# Patient Record
Sex: MALE | Race: WHITE | NOT HISPANIC OR LATINO
[De-identification: names, ages, dates, MRNs, and addresses within clinical notes are randomized per-mention and may not be internally consistent; named-entity substitution may affect disease eponyms.]

---

## 2017-05-20 ENCOUNTER — TRANSCRIPTION ENCOUNTER (OUTPATIENT)
Age: 62
End: 2017-05-20

## 2018-12-18 ENCOUNTER — EMERGENCY (EMERGENCY)
Facility: HOSPITAL | Age: 63
LOS: 1 days | Discharge: HOME | End: 2018-12-18
Attending: EMERGENCY MEDICINE | Admitting: EMERGENCY MEDICINE
Payer: MEDICARE

## 2018-12-18 ENCOUNTER — TRANSCRIPTION ENCOUNTER (OUTPATIENT)
Age: 63
End: 2018-12-18

## 2018-12-18 VITALS
WEIGHT: 235.01 LBS | OXYGEN SATURATION: 98 % | SYSTOLIC BLOOD PRESSURE: 156 MMHG | DIASTOLIC BLOOD PRESSURE: 94 MMHG | RESPIRATION RATE: 16 BRPM | HEIGHT: 73 IN | TEMPERATURE: 98 F | HEART RATE: 98 BPM

## 2018-12-18 DIAGNOSIS — S82.899A OTHER FRACTURE OF UNSPECIFIED LOWER LEG, INITIAL ENCOUNTER FOR CLOSED FRACTURE: Chronic | ICD-10-CM

## 2018-12-18 DIAGNOSIS — Z98.890 OTHER SPECIFIED POSTPROCEDURAL STATES: Chronic | ICD-10-CM

## 2018-12-18 LAB
ALBUMIN SERPL ELPH-MCNC: 3.5 G/DL — SIGNIFICANT CHANGE UP (ref 3.3–5)
ALP SERPL-CCNC: 131 U/L — HIGH (ref 30–120)
ALT FLD-CCNC: 23 U/L DA — SIGNIFICANT CHANGE UP (ref 10–60)
ANION GAP SERPL CALC-SCNC: 9 MMOL/L — SIGNIFICANT CHANGE UP (ref 5–17)
APPEARANCE UR: CLEAR — SIGNIFICANT CHANGE UP
APTT BLD: 31.7 SEC — SIGNIFICANT CHANGE UP (ref 28.5–37)
AST SERPL-CCNC: 30 U/L — SIGNIFICANT CHANGE UP (ref 10–40)
BASOPHILS # BLD AUTO: 0.07 K/UL — SIGNIFICANT CHANGE UP (ref 0–0.2)
BASOPHILS NFR BLD AUTO: 0.5 % — SIGNIFICANT CHANGE UP (ref 0–2)
BILIRUB SERPL-MCNC: 0.5 MG/DL — SIGNIFICANT CHANGE UP (ref 0.2–1.2)
BILIRUB UR-MCNC: NEGATIVE — SIGNIFICANT CHANGE UP
BUN SERPL-MCNC: 16 MG/DL — SIGNIFICANT CHANGE UP (ref 7–23)
CALCIUM SERPL-MCNC: 9.1 MG/DL — SIGNIFICANT CHANGE UP (ref 8.4–10.5)
CHLORIDE SERPL-SCNC: 106 MMOL/L — SIGNIFICANT CHANGE UP (ref 96–108)
CO2 SERPL-SCNC: 28 MMOL/L — SIGNIFICANT CHANGE UP (ref 22–31)
COLOR SPEC: YELLOW — SIGNIFICANT CHANGE UP
CREAT SERPL-MCNC: 1.13 MG/DL — SIGNIFICANT CHANGE UP (ref 0.5–1.3)
DIFF PNL FLD: NEGATIVE — SIGNIFICANT CHANGE UP
EOSINOPHIL # BLD AUTO: 0.36 K/UL — SIGNIFICANT CHANGE UP (ref 0–0.5)
EOSINOPHIL NFR BLD AUTO: 2.7 % — SIGNIFICANT CHANGE UP (ref 0–6)
GLUCOSE SERPL-MCNC: 100 MG/DL — HIGH (ref 70–99)
GLUCOSE UR QL: NEGATIVE MG/DL — SIGNIFICANT CHANGE UP
HCT VFR BLD CALC: 47.8 % — SIGNIFICANT CHANGE UP (ref 39–50)
HGB BLD-MCNC: 15.8 G/DL — SIGNIFICANT CHANGE UP (ref 13–17)
IMM GRANULOCYTES NFR BLD AUTO: 2.4 % — HIGH (ref 0–1.5)
INR BLD: 1.02 RATIO — SIGNIFICANT CHANGE UP (ref 0.88–1.16)
KETONES UR-MCNC: NEGATIVE — SIGNIFICANT CHANGE UP
LEUKOCYTE ESTERASE UR-ACNC: NEGATIVE — SIGNIFICANT CHANGE UP
LIDOCAIN IGE QN: 158 U/L — SIGNIFICANT CHANGE UP (ref 73–393)
LYMPHOCYTES # BLD AUTO: 1.94 K/UL — SIGNIFICANT CHANGE UP (ref 1–3.3)
LYMPHOCYTES # BLD AUTO: 14.4 % — SIGNIFICANT CHANGE UP (ref 13–44)
MCHC RBC-ENTMCNC: 28 PG — SIGNIFICANT CHANGE UP (ref 27–34)
MCHC RBC-ENTMCNC: 33.1 GM/DL — SIGNIFICANT CHANGE UP (ref 32–36)
MCV RBC AUTO: 84.8 FL — SIGNIFICANT CHANGE UP (ref 80–100)
MONOCYTES # BLD AUTO: 0.86 K/UL — SIGNIFICANT CHANGE UP (ref 0–0.9)
MONOCYTES NFR BLD AUTO: 6.4 % — SIGNIFICANT CHANGE UP (ref 2–14)
NEUTROPHILS # BLD AUTO: 9.9 K/UL — HIGH (ref 1.8–7.4)
NEUTROPHILS NFR BLD AUTO: 73.6 % — SIGNIFICANT CHANGE UP (ref 43–77)
NITRITE UR-MCNC: NEGATIVE — SIGNIFICANT CHANGE UP
PH UR: 7 — SIGNIFICANT CHANGE UP (ref 5–8)
PLATELET # BLD AUTO: 214 K/UL — SIGNIFICANT CHANGE UP (ref 150–400)
POTASSIUM SERPL-MCNC: 4.4 MMOL/L — SIGNIFICANT CHANGE UP (ref 3.5–5.3)
POTASSIUM SERPL-SCNC: 4.4 MMOL/L — SIGNIFICANT CHANGE UP (ref 3.5–5.3)
PROT SERPL-MCNC: 7.1 G/DL — SIGNIFICANT CHANGE UP (ref 6–8.3)
PROT UR-MCNC: 15 MG/DL
PROTHROM AB SERPL-ACNC: 11.1 SEC — SIGNIFICANT CHANGE UP (ref 10–12.9)
RBC # BLD: 5.64 M/UL — SIGNIFICANT CHANGE UP (ref 4.2–5.8)
RBC # FLD: 13.4 % — SIGNIFICANT CHANGE UP (ref 10.3–14.5)
SODIUM SERPL-SCNC: 143 MMOL/L — SIGNIFICANT CHANGE UP (ref 135–145)
SP GR SPEC: 1.01 — SIGNIFICANT CHANGE UP (ref 1.01–1.02)
UROBILINOGEN FLD QL: NEGATIVE MG/DL — SIGNIFICANT CHANGE UP
WBC # BLD: 13.45 K/UL — HIGH (ref 3.8–10.5)
WBC # FLD AUTO: 13.45 K/UL — HIGH (ref 3.8–10.5)

## 2018-12-18 PROCEDURE — 99284 EMERGENCY DEPT VISIT MOD MDM: CPT | Mod: 25

## 2018-12-18 PROCEDURE — 74177 CT ABD & PELVIS W/CONTRAST: CPT | Mod: 26

## 2018-12-18 PROCEDURE — 81001 URINALYSIS AUTO W/SCOPE: CPT

## 2018-12-18 PROCEDURE — 71046 X-RAY EXAM CHEST 2 VIEWS: CPT

## 2018-12-18 PROCEDURE — 85610 PROTHROMBIN TIME: CPT

## 2018-12-18 PROCEDURE — 71275 CT ANGIOGRAPHY CHEST: CPT

## 2018-12-18 PROCEDURE — 85027 COMPLETE CBC AUTOMATED: CPT

## 2018-12-18 PROCEDURE — 85730 THROMBOPLASTIN TIME PARTIAL: CPT

## 2018-12-18 PROCEDURE — 76705 ECHO EXAM OF ABDOMEN: CPT

## 2018-12-18 PROCEDURE — 71275 CT ANGIOGRAPHY CHEST: CPT | Mod: 26

## 2018-12-18 PROCEDURE — 71046 X-RAY EXAM CHEST 2 VIEWS: CPT | Mod: 26

## 2018-12-18 PROCEDURE — 74177 CT ABD & PELVIS W/CONTRAST: CPT

## 2018-12-18 PROCEDURE — 99284 EMERGENCY DEPT VISIT MOD MDM: CPT

## 2018-12-18 PROCEDURE — 36415 COLL VENOUS BLD VENIPUNCTURE: CPT

## 2018-12-18 PROCEDURE — 80053 COMPREHEN METABOLIC PANEL: CPT

## 2018-12-18 PROCEDURE — 76705 ECHO EXAM OF ABDOMEN: CPT | Mod: 26

## 2018-12-18 PROCEDURE — 83690 ASSAY OF LIPASE: CPT

## 2018-12-18 RX ORDER — IOHEXOL 300 MG/ML
30 INJECTION, SOLUTION INTRAVENOUS ONCE
Qty: 0 | Refills: 0 | Status: COMPLETED | OUTPATIENT
Start: 2018-12-18 | End: 2018-12-18

## 2018-12-18 RX ADMIN — IOHEXOL 30 MILLILITER(S): 300 INJECTION, SOLUTION INTRAVENOUS at 17:55

## 2018-12-18 NOTE — ED PROVIDER NOTE - MEDICAL DECISION MAKING DETAILS
62 y/o M with hx schizophrenia, smoker c/o RUQ pain x 8 days, currently on levaquin for pna, sent from urgent care today for gallbladder US, also with ecchymosis to RLQ/R flank, no fever/n/v/d; Will get labs, ua, ct chest and abdomen, re-assess

## 2018-12-18 NOTE — ED PROVIDER NOTE - OBJECTIVE STATEMENT
64 y/o M smoker with hx of schizophrenia presents with c/o RUQ pain x 8 days. Pt states that he has chronic cough, was seen by PMD, Dr. Barroso 8 days ago, had CXR which showed "small pneumonia", started on levaquin (day 8 today). States that he continued to have pain and was seen at [facility] ED few days later, had cxr and ct chest which showed thoracic aortic aneurysm, discharged home as costochondritis on nsaids for pain. Pt states that he went to urgent care today, sent to ED for gallbladder US due to RUQ pain, also with bruising to R lower abdomen. States that he felt a "pull" in his stomach few days ago while causing and after noted bruising to his abdomen. Denies trauma/fall, fever, n/v/d, CP, SOB, hematuria.

## 2018-12-18 NOTE — ED ADULT TRIAGE NOTE - CHIEF COMPLAINT QUOTE
PAtient sent from Urgent care for RUQ abdominal pain. Patient reports RUQ abdominal pain that radiates to Right flank. Patient reports he is on day 8/10 of levaquin for walking PNA as per PMD Dharmesh. Patient reports pain in RUQ worsens with palpation, coughing. Described as stabbing pain. Denies Nausea vomiting diarrhea.

## 2018-12-18 NOTE — ED ADULT NURSE NOTE - OBJECTIVE STATEMENT
Patient walked into ER from Urgent Care for pain under right breast going to back for over a week now.  Patient diagnosed with pneumonia 8 days ago and is on Levaquin.  Patient has ecchymosis area to lower right abdomen which he thinks came from coughing a lot.

## 2018-12-18 NOTE — ED ADULT NURSE NOTE - NSIMPLEMENTINTERV_GEN_ALL_ED
Implemented All Universal Safety Interventions:  [place] to call system. Call bell, personal items and telephone within reach. Instruct patient to call for assistance. Room bathroom lighting operational. Non-slip footwear when patient is off stretcher. Physically safe environment: no spills, clutter or unnecessary equipment. Stretcher in lowest position, wheels locked, appropriate side rails in place.

## 2018-12-18 NOTE — ED PROVIDER NOTE - PROGRESS NOTE DETAILS
Attending Contribution to Care: 64 y/o M pt with schizophrenia been treated for pneumonia with Levaquin by Dr. Barroso. Went to Urgent Care today for R flank pain, was told to go to ER to r/o gallbladder problem Denies fever, N/V, SOB, other sx. Continues to smoke cigarettes daily. PE: afebrile, NAD, lungs clear, abd soft nontender but has large area of ecchymosis of right flank. Plan: r/o gallbladder disease, r/o rib fracture or other intra-abdominal problem. Pt examined by ED attending, Dr. Escobedo who agreed with disposition and plan.

## 2018-12-18 NOTE — ED ADULT NURSE REASSESSMENT NOTE - NS ED NURSE REASSESS COMMENT FT1
Pt received in stretcher; appears comfortable; pt states he is comfortable however experiences pain in upper right quad of abdomen when coughing; ecchymotic area noted to right lower abdomen; pt awaiting CT scan; drank contrast; tolerated well.

## 2018-12-18 NOTE — ED PROVIDER NOTE - ABDOMINAL TENDER
+mild ttp RUQ, neg murphys sign, no rebound or guarding, no tenderness to RLQ, no CVAT B/right upper quadrant

## 2018-12-19 VITALS
OXYGEN SATURATION: 95 % | RESPIRATION RATE: 16 BRPM | TEMPERATURE: 98 F | HEART RATE: 94 BPM | DIASTOLIC BLOOD PRESSURE: 102 MMHG | SYSTOLIC BLOOD PRESSURE: 148 MMHG

## 2019-06-18 PROBLEM — F20.9 SCHIZOPHRENIA, UNSPECIFIED: Chronic | Status: ACTIVE | Noted: 2018-12-18

## 2019-07-16 ENCOUNTER — OUTPATIENT (OUTPATIENT)
Dept: OUTPATIENT SERVICES | Facility: HOSPITAL | Age: 64
LOS: 1 days | Discharge: HOME | End: 2019-07-16
Payer: MEDICARE

## 2019-07-16 VITALS
TEMPERATURE: 97 F | DIASTOLIC BLOOD PRESSURE: 97 MMHG | HEIGHT: 73 IN | OXYGEN SATURATION: 99 % | SYSTOLIC BLOOD PRESSURE: 145 MMHG | WEIGHT: 237.88 LBS | HEART RATE: 81 BPM | RESPIRATION RATE: 19 BRPM

## 2019-07-16 DIAGNOSIS — S82.899A OTHER FRACTURE OF UNSPECIFIED LOWER LEG, INITIAL ENCOUNTER FOR CLOSED FRACTURE: Chronic | ICD-10-CM

## 2019-07-16 DIAGNOSIS — Z01.818 ENCOUNTER FOR OTHER PREPROCEDURAL EXAMINATION: ICD-10-CM

## 2019-07-16 DIAGNOSIS — F20.9 SCHIZOPHRENIA, UNSPECIFIED: ICD-10-CM

## 2019-07-16 DIAGNOSIS — I10 ESSENTIAL (PRIMARY) HYPERTENSION: ICD-10-CM

## 2019-07-16 DIAGNOSIS — Z98.890 OTHER SPECIFIED POSTPROCEDURAL STATES: Chronic | ICD-10-CM

## 2019-07-16 DIAGNOSIS — M16.11 UNILATERAL PRIMARY OSTEOARTHRITIS, RIGHT HIP: ICD-10-CM

## 2019-07-16 LAB
ANION GAP SERPL CALC-SCNC: 5 MMOL/L — SIGNIFICANT CHANGE UP (ref 5–17)
ANISOCYTOSIS BLD QL: SLIGHT — SIGNIFICANT CHANGE UP
APTT BLD: 33.4 SEC — SIGNIFICANT CHANGE UP (ref 28.5–37)
BASOPHILS # BLD AUTO: 0 K/UL — SIGNIFICANT CHANGE UP (ref 0–0.2)
BASOPHILS NFR BLD AUTO: 0 % — SIGNIFICANT CHANGE UP (ref 0–2)
BLD GP AB SCN SERPL QL: SIGNIFICANT CHANGE UP
BUN SERPL-MCNC: 15 MG/DL — SIGNIFICANT CHANGE UP (ref 7–23)
CALCIUM SERPL-MCNC: 8.8 MG/DL — SIGNIFICANT CHANGE UP (ref 8.5–10.1)
CHLORIDE SERPL-SCNC: 110 MMOL/L — HIGH (ref 96–108)
CO2 SERPL-SCNC: 29 MMOL/L — SIGNIFICANT CHANGE UP (ref 22–31)
CREAT SERPL-MCNC: 1.22 MG/DL — SIGNIFICANT CHANGE UP (ref 0.5–1.3)
ELLIPTOCYTES BLD QL SMEAR: SLIGHT — SIGNIFICANT CHANGE UP
EOSINOPHIL # BLD AUTO: 0.23 K/UL — SIGNIFICANT CHANGE UP (ref 0–0.5)
EOSINOPHIL NFR BLD AUTO: 3 % — SIGNIFICANT CHANGE UP (ref 0–6)
GLUCOSE SERPL-MCNC: 112 MG/DL — HIGH (ref 70–99)
HBA1C BLD-MCNC: 5.9 % — HIGH (ref 4–5.6)
HCT VFR BLD CALC: 48.4 % — SIGNIFICANT CHANGE UP (ref 39–50)
HGB BLD-MCNC: 15.3 G/DL — SIGNIFICANT CHANGE UP (ref 13–17)
INR BLD: 0.95 RATIO — SIGNIFICANT CHANGE UP (ref 0.88–1.16)
LG PLATELETS BLD QL AUTO: SLIGHT — SIGNIFICANT CHANGE UP
LYMPHOCYTES # BLD AUTO: 1.21 K/UL — SIGNIFICANT CHANGE UP (ref 1–3.3)
LYMPHOCYTES # BLD AUTO: 16 % — SIGNIFICANT CHANGE UP (ref 13–44)
MANUAL SMEAR VERIFICATION: SIGNIFICANT CHANGE UP
MCHC RBC-ENTMCNC: 27.3 PG — SIGNIFICANT CHANGE UP (ref 27–34)
MCHC RBC-ENTMCNC: 31.6 GM/DL — LOW (ref 32–36)
MCV RBC AUTO: 86.4 FL — SIGNIFICANT CHANGE UP (ref 80–100)
MICROCYTES BLD QL: SIGNIFICANT CHANGE UP
MONOCYTES # BLD AUTO: 0.23 K/UL — SIGNIFICANT CHANGE UP (ref 0–0.9)
MONOCYTES NFR BLD AUTO: 3 % — SIGNIFICANT CHANGE UP (ref 2–14)
MRSA PCR RESULT.: SIGNIFICANT CHANGE UP
NEUTROPHILS # BLD AUTO: 5.91 K/UL — SIGNIFICANT CHANGE UP (ref 1.8–7.4)
NEUTROPHILS NFR BLD AUTO: 78 % — HIGH (ref 43–77)
NRBC # BLD: 0 /100 — SIGNIFICANT CHANGE UP (ref 0–0)
NRBC # BLD: SIGNIFICANT CHANGE UP /100 WBCS (ref 0–0)
PLAT MORPH BLD: NORMAL — SIGNIFICANT CHANGE UP
PLATELET # BLD AUTO: 181 K/UL — SIGNIFICANT CHANGE UP (ref 150–400)
POTASSIUM SERPL-MCNC: 4.4 MMOL/L — SIGNIFICANT CHANGE UP (ref 3.5–5.3)
POTASSIUM SERPL-SCNC: 4.4 MMOL/L — SIGNIFICANT CHANGE UP (ref 3.5–5.3)
PROTHROM AB SERPL-ACNC: 10.6 SEC — SIGNIFICANT CHANGE UP (ref 10–12.9)
RBC # BLD: 5.6 M/UL — SIGNIFICANT CHANGE UP (ref 4.2–5.8)
RBC # FLD: 15.6 % — HIGH (ref 10.3–14.5)
RBC BLD AUTO: ABNORMAL
S AUREUS DNA NOSE QL NAA+PROBE: SIGNIFICANT CHANGE UP
SODIUM SERPL-SCNC: 144 MMOL/L — SIGNIFICANT CHANGE UP (ref 135–145)
WBC # BLD: 7.58 K/UL — SIGNIFICANT CHANGE UP (ref 3.8–10.5)
WBC # FLD AUTO: 7.58 K/UL — SIGNIFICANT CHANGE UP (ref 3.8–10.5)

## 2019-07-16 PROCEDURE — 93010 ELECTROCARDIOGRAM REPORT: CPT

## 2019-07-16 RX ORDER — CIPROFLOXACIN LACTATE 400MG/40ML
1 VIAL (ML) INTRAVENOUS
Qty: 0 | Refills: 0 | DISCHARGE

## 2019-07-16 NOTE — H&P PST ADULT - ASSESSMENT
64M pmh htn, schizophrenia  c/o right hip pain 2/2 unilateral primary osteoarthritis here for PST for scheduled Right total hip arthroplasty  CAPRINI SCORE    AGE RELATED RISK FACTORS                                                       MOBILITY RELATED FACTORS  [ ] Age 41-60 years                                            (1 Point)                  [ ] Bed rest                                                        (1 Point)  [x ] Age: 61-74 years                                           (2 Points)                [ ] Plaster cast                                                   (2 Points)  [ ] Age= 75 years                                              (3 Points)                 [ ] Bed bound for more than 72 hours                   (2 Points)    DISEASE RELATED RISK FACTORS                                               GENDER SPECIFIC FACTORS  [ ] Edema in the lower extremities                       (1 Point)                  [ ] Pregnancy                                                     (1 Point)  [ ] Varicose veins                                               (1 Point)                  [ ] Post-partum < 6 weeks                                   (1 Point)             [x ] BMI > 25 Kg/m2                                            (1 Point)                  [ ] Hormonal therapy  or oral contraception            (1 Point)                 [ ] Sepsis (in the previous month)                        (1 Point)                  [ ] History of pregnancy complications  [ ] Pneumonia or serious lung disease                                               [ ] Unexplained or recurrent                       (1 Point)           (in the previous month)                               (1 Point)  [ ] Abnormal pulmonary function test                     (1 Point)                 SURGERY RELATED RISK FACTORS  [ ] Acute myocardial infarction                              (1 Point)                 [ ]  Section                                            (1 Point)  [ ] Congestive heart failure (in the previous month)  (1 Point)                 [ ] Minor surgery                                                 (1 Point)   [ ] Inflammatory bowel disease                             (1 Point)                 [ ] Arthroscopic surgery                                        (2 Points)  [ ] Central venous access                                    (2 Points)                [x ] General surgery lasting more than 45 minutes   (2 Points)       [ ] Stroke (in the previous month)                          (5 Points)               [ ] Elective arthroplasty                                        (5 Points)                                                                                                                                               HEMATOLOGY RELATED FACTORS                                                 TRAUMA RELATED RISK FACTORS  [ ] Prior episodes of VTE                                     (3 Points)                 [ ] Fracture of the hip, pelvis, or leg                       (5 Points)  [ ] Positive family history for VTE                         (3 Points)                 [ ] Acute spinal cord injury (in the previous month)  (5 Points)  [ ] Prothrombin 49300 A                                      (3 Points)                 [ ] Paralysis  (less than 1 month)                          (5 Points)  [ ] Factor V Leiden                                             (3 Points)                 [ ] Multiple Trauma within 1 month                         (5 Points)  [ ] Lupus anticoagulants                                     (3 Points)                                                           [ ] Anticardiolipin antibodies                                (3 Points)                                                       [ ] High homocysteine in the blood                      (3 Points)                                             [ ] Other congenital or acquired thrombophilia       (3 Points)                                                [ ] Heparin induced thrombocytopenia                  (3 Points)                                          Total Score [    8      ]

## 2019-07-16 NOTE — H&P PST ADULT - NSICDXPASTSURGICALHX_GEN_ALL_CORE_FT
PAST SURGICAL HISTORY:  Ankle fracture left with repair    H/O resection of rib     S/P tendon repair on right hand

## 2019-07-16 NOTE — H&P PST ADULT - HISTORY OF PRESENT ILLNESS
64M pmh htn, schizophrenia ---- c/o right hip pain 2/2 unilateral primary osteoarthritis here for PST for scheduled Right total hip arthroplasty 64M pmh htn, schizophrenia  c/o right hip pain 2/2 unilateral primary osteoarthritis here for PST for scheduled Right total hip arthroplasty

## 2019-07-16 NOTE — OCCUPATIONAL THERAPY INITIAL EVALUATION ADULT - SOCIAL CONCERNS
Complex psychosocial needs/coping issues/Pt voiced concerns about his recovery at home due to lack of support..

## 2019-07-16 NOTE — PHYSICAL THERAPY INITIAL EVALUATION ADULT - ADDITIONAL COMMENTS
Pt lives in a second floor apt of an rented house but pt will be able to stay on the main floor for one month post surgery. There no step at the entry and amrita rails,  close and able to be reached simultaneously, to go to the second floor. Pt has a tub/shower combo c fixed and retractable shower heads and regular toilet seat in BR. 3-1commode will fit well in the BR. Pt is R handed and drives. he landlord will be available to assist pt in post -op care upon discharge home. Pt has no DME.

## 2019-07-16 NOTE — OCCUPATIONAL THERAPY INITIAL EVALUATION ADULT - ADDITIONAL COMMENTS
Pt is functioning in his roles, self sufficient, driving & ambulating independently in the community without any assistive devices. Pt is right hand dominant and wears glasses for reading. Pt c/o 7/10 pain in his right hip ; this intensifies with changes in the weather, prolonged standing walking and it impacts ADL management;  pt takes no medication for pain relief. Pt scores 90% of patient specific scale.

## 2019-07-16 NOTE — OCCUPATIONAL THERAPY INITIAL EVALUATION ADULT - RANGE OF MOTION EXAMINATION, LOWER EXTREMITY
Left LE Active ROM was WNL  (within normal limits)/Right LE Active ROM was WFL   (within functional limits)/Right LE Passive ROM was WFL  (within functional limits)/Left LE Passive ROM was WNL (within normal limits)

## 2019-07-16 NOTE — PHYSICAL THERAPY INITIAL EVALUATION ADULT - CRITERIA FOR SKILLED THERAPEUTIC INTERVENTIONS
risk reduction/prevention/anticipated discharge recommendation/anticipated equipment needs at discharge/therapy frequency/functional limitations in following categories/impairments found/rehab potential

## 2019-07-16 NOTE — OCCUPATIONAL THERAPY INITIAL EVALUATION ADULT - LIVES WITH, PROFILE
alone/in a rented room in a private house with no steps to enter. Once inside, pt has to negotiate a flight of stairs with 20 step left hand rail to access the bedroom and bathroom. The bathroom has a walk in shower combination and standard toilet. Pt's toilet has adequate space to fit a commode over it. . Pt stated he will make arrangement with his landlord to stay in the living room on the main floor. alone/in a rented room in a private house with no steps to enter. Once inside, pt has to negotiate stairs with 20 steps left hand rail to access the bedroom and bathroom. The bathroom has a walk in shower combination and standard toilet. Pt's toilet has adequate space to fit a commode over it. . Pt stated he will make arrangement with his landlord to stay in the living room on the main floor.

## 2019-07-16 NOTE — H&P PST ADULT - NSICDXPROBLEM_GEN_ALL_CORE_FT
PROBLEM DIAGNOSES  Problem: Unilateral primary osteoarthritis, right hip  Assessment and Plan: right total hip replacement  Pre-op instructions given, patient verbalized understanding  Chlorhexidine wash instructions given   Pending Medical clearance    Problem: HTN (hypertension)  Assessment and Plan: Continue current regimen and medications.     Problem: Schizophrenia  Assessment and Plan: Continue current regimen and medications.

## 2019-07-16 NOTE — OCCUPATIONAL THERAPY INITIAL EVALUATION ADULT - PERTINENT HX OF CURRENT PROBLEM, REHAB EVAL
Pt is slated for elective surgery for right THR at a later date with MD Hernandez due to OA, chronic pain and DJD. Pt denied any pain falls in the past 3-6 months

## 2019-07-16 NOTE — H&P PST ADULT - NSANTHOSAYNRD_GEN_A_CORE
No. NIKIA screening performed.  STOP BANG Legend: 0-2 = LOW Risk; 3-4 = INTERMEDIATE Risk; 5-8 = HIGH Risk

## 2019-07-29 ENCOUNTER — TRANSCRIPTION ENCOUNTER (OUTPATIENT)
Age: 64
End: 2019-07-29

## 2019-07-30 ENCOUNTER — RESULT REVIEW (OUTPATIENT)
Age: 64
End: 2019-07-30

## 2019-07-30 ENCOUNTER — TRANSCRIPTION ENCOUNTER (OUTPATIENT)
Age: 64
End: 2019-07-30

## 2019-07-30 ENCOUNTER — INPATIENT (INPATIENT)
Facility: HOSPITAL | Age: 64
LOS: 0 days | Discharge: HOME HEALTH | End: 2019-07-31
Attending: ORTHOPAEDIC SURGERY | Admitting: ORTHOPAEDIC SURGERY
Payer: MEDICARE

## 2019-07-30 VITALS
SYSTOLIC BLOOD PRESSURE: 132 MMHG | TEMPERATURE: 99 F | HEART RATE: 99 BPM | HEIGHT: 73 IN | WEIGHT: 240.08 LBS | RESPIRATION RATE: 18 BRPM | DIASTOLIC BLOOD PRESSURE: 81 MMHG | OXYGEN SATURATION: 94 %

## 2019-07-30 DIAGNOSIS — Z98.890 OTHER SPECIFIED POSTPROCEDURAL STATES: Chronic | ICD-10-CM

## 2019-07-30 DIAGNOSIS — S82.899A OTHER FRACTURE OF UNSPECIFIED LOWER LEG, INITIAL ENCOUNTER FOR CLOSED FRACTURE: Chronic | ICD-10-CM

## 2019-07-30 LAB
BLD GP AB SCN SERPL QL: SIGNIFICANT CHANGE UP
HCT VFR BLD CALC: 42 % — SIGNIFICANT CHANGE UP (ref 39–50)
HGB BLD-MCNC: 13.4 G/DL — SIGNIFICANT CHANGE UP (ref 13–17)
MCHC RBC-ENTMCNC: 28.3 PG — SIGNIFICANT CHANGE UP (ref 27–34)
MCHC RBC-ENTMCNC: 31.9 GM/DL — LOW (ref 32–36)
MCV RBC AUTO: 88.6 FL — SIGNIFICANT CHANGE UP (ref 80–100)
NRBC # BLD: 0 /100 WBCS — SIGNIFICANT CHANGE UP (ref 0–0)
PLATELET # BLD AUTO: 163 K/UL — SIGNIFICANT CHANGE UP (ref 150–400)
RBC # BLD: 4.74 M/UL — SIGNIFICANT CHANGE UP (ref 4.2–5.8)
RBC # FLD: 15.6 % — HIGH (ref 10.3–14.5)
WBC # BLD: 13.59 K/UL — HIGH (ref 3.8–10.5)
WBC # FLD AUTO: 13.59 K/UL — HIGH (ref 3.8–10.5)

## 2019-07-30 PROCEDURE — 88311 DECALCIFY TISSUE: CPT | Mod: 26

## 2019-07-30 PROCEDURE — 88305 TISSUE EXAM BY PATHOLOGIST: CPT | Mod: 26

## 2019-07-30 RX ORDER — ONDANSETRON 8 MG/1
4 TABLET, FILM COATED ORAL EVERY 6 HOURS
Refills: 0 | Status: DISCONTINUED | OUTPATIENT
Start: 2019-07-30 | End: 2019-07-31

## 2019-07-30 RX ORDER — FENTANYL CITRATE 50 UG/ML
50 INJECTION INTRAVENOUS
Refills: 0 | Status: DISCONTINUED | OUTPATIENT
Start: 2019-07-30 | End: 2019-07-30

## 2019-07-30 RX ORDER — FOLIC ACID 0.8 MG
1 TABLET ORAL DAILY
Refills: 0 | Status: DISCONTINUED | OUTPATIENT
Start: 2019-07-30 | End: 2019-07-31

## 2019-07-30 RX ORDER — FERROUS SULFATE 325(65) MG
325 TABLET ORAL
Refills: 0 | Status: DISCONTINUED | OUTPATIENT
Start: 2019-07-30 | End: 2019-07-31

## 2019-07-30 RX ORDER — ACETAMINOPHEN 500 MG
650 TABLET ORAL ONCE
Refills: 0 | Status: COMPLETED | OUTPATIENT
Start: 2019-07-30 | End: 2019-07-30

## 2019-07-30 RX ORDER — MAGNESIUM HYDROXIDE 400 MG/1
30 TABLET, CHEWABLE ORAL DAILY
Refills: 0 | Status: DISCONTINUED | OUTPATIENT
Start: 2019-07-30 | End: 2019-07-31

## 2019-07-30 RX ORDER — LOSARTAN POTASSIUM 100 MG/1
1 TABLET, FILM COATED ORAL
Qty: 0 | Refills: 0 | DISCHARGE

## 2019-07-30 RX ORDER — POLYETHYLENE GLYCOL 3350 17 G/17G
17 POWDER, FOR SOLUTION ORAL DAILY
Refills: 0 | Status: DISCONTINUED | OUTPATIENT
Start: 2019-07-30 | End: 2019-07-31

## 2019-07-30 RX ORDER — HYDROMORPHONE HYDROCHLORIDE 2 MG/ML
0.5 INJECTION INTRAMUSCULAR; INTRAVENOUS; SUBCUTANEOUS EVERY 4 HOURS
Refills: 0 | Status: DISCONTINUED | OUTPATIENT
Start: 2019-07-30 | End: 2019-07-31

## 2019-07-30 RX ORDER — SODIUM CHLORIDE 9 MG/ML
3 INJECTION INTRAMUSCULAR; INTRAVENOUS; SUBCUTANEOUS EVERY 8 HOURS
Refills: 0 | Status: DISCONTINUED | OUTPATIENT
Start: 2019-07-30 | End: 2019-07-30

## 2019-07-30 RX ORDER — SODIUM CHLORIDE 9 MG/ML
1000 INJECTION INTRAMUSCULAR; INTRAVENOUS; SUBCUTANEOUS
Refills: 0 | Status: DISCONTINUED | OUTPATIENT
Start: 2019-07-30 | End: 2019-07-31

## 2019-07-30 RX ORDER — ACETAMINOPHEN 500 MG
975 TABLET ORAL EVERY 8 HOURS
Refills: 0 | Status: DISCONTINUED | OUTPATIENT
Start: 2019-07-30 | End: 2019-07-31

## 2019-07-30 RX ORDER — PANTOPRAZOLE SODIUM 20 MG/1
40 TABLET, DELAYED RELEASE ORAL
Refills: 0 | Status: DISCONTINUED | OUTPATIENT
Start: 2019-07-30 | End: 2019-07-31

## 2019-07-30 RX ORDER — OXYCODONE HYDROCHLORIDE 5 MG/1
10 TABLET ORAL EVERY 4 HOURS
Refills: 0 | Status: DISCONTINUED | OUTPATIENT
Start: 2019-07-30 | End: 2019-07-31

## 2019-07-30 RX ORDER — LOSARTAN POTASSIUM 100 MG/1
100 TABLET, FILM COATED ORAL DAILY
Refills: 0 | Status: DISCONTINUED | OUTPATIENT
Start: 2019-07-30 | End: 2019-07-31

## 2019-07-30 RX ORDER — ARIPIPRAZOLE 15 MG/1
1 TABLET ORAL
Qty: 0 | Refills: 0 | DISCHARGE

## 2019-07-30 RX ORDER — SENNA PLUS 8.6 MG/1
2 TABLET ORAL AT BEDTIME
Refills: 0 | Status: DISCONTINUED | OUTPATIENT
Start: 2019-07-30 | End: 2019-07-31

## 2019-07-30 RX ORDER — SODIUM CHLORIDE 9 MG/ML
1000 INJECTION, SOLUTION INTRAVENOUS
Refills: 0 | Status: DISCONTINUED | OUTPATIENT
Start: 2019-07-30 | End: 2019-07-30

## 2019-07-30 RX ORDER — DOCUSATE SODIUM 100 MG
100 CAPSULE ORAL THREE TIMES A DAY
Refills: 0 | Status: DISCONTINUED | OUTPATIENT
Start: 2019-07-30 | End: 2019-07-31

## 2019-07-30 RX ORDER — CELECOXIB 200 MG/1
200 CAPSULE ORAL ONCE
Refills: 0 | Status: COMPLETED | OUTPATIENT
Start: 2019-07-30 | End: 2019-07-30

## 2019-07-30 RX ORDER — OXYCODONE HYDROCHLORIDE 5 MG/1
5 TABLET ORAL EVERY 4 HOURS
Refills: 0 | Status: DISCONTINUED | OUTPATIENT
Start: 2019-07-30 | End: 2019-07-31

## 2019-07-30 RX ORDER — OXYCODONE HYDROCHLORIDE 5 MG/1
10 TABLET ORAL ONCE
Refills: 0 | Status: DISCONTINUED | OUTPATIENT
Start: 2019-07-30 | End: 2019-07-30

## 2019-07-30 RX ORDER — ASPIRIN/CALCIUM CARB/MAGNESIUM 324 MG
325 TABLET ORAL
Refills: 0 | Status: DISCONTINUED | OUTPATIENT
Start: 2019-07-31 | End: 2019-07-31

## 2019-07-30 RX ORDER — DEXAMETHASONE 0.5 MG/5ML
8 ELIXIR ORAL ONCE
Refills: 0 | Status: COMPLETED | OUTPATIENT
Start: 2019-07-31 | End: 2019-07-31

## 2019-07-30 RX ORDER — CELECOXIB 200 MG/1
200 CAPSULE ORAL DAILY
Refills: 0 | Status: DISCONTINUED | OUTPATIENT
Start: 2019-07-31 | End: 2019-07-31

## 2019-07-30 RX ORDER — VANCOMYCIN HCL 1 G
1500 VIAL (EA) INTRAVENOUS ONCE
Refills: 0 | Status: COMPLETED | OUTPATIENT
Start: 2019-07-30 | End: 2019-07-30

## 2019-07-30 RX ORDER — FENTANYL CITRATE 50 UG/ML
25 INJECTION INTRAVENOUS
Refills: 0 | Status: DISCONTINUED | OUTPATIENT
Start: 2019-07-30 | End: 2019-07-30

## 2019-07-30 RX ORDER — ARIPIPRAZOLE 15 MG/1
30 TABLET ORAL DAILY
Refills: 0 | Status: DISCONTINUED | OUTPATIENT
Start: 2019-07-30 | End: 2019-07-31

## 2019-07-30 RX ADMIN — Medication 975 MILLIGRAM(S): at 21:05

## 2019-07-30 RX ADMIN — Medication 100 MILLIGRAM(S): at 23:00

## 2019-07-30 RX ADMIN — Medication 325 MILLIGRAM(S): at 16:37

## 2019-07-30 RX ADMIN — CELECOXIB 200 MILLIGRAM(S): 200 CAPSULE ORAL at 07:29

## 2019-07-30 RX ADMIN — Medication 975 MILLIGRAM(S): at 13:20

## 2019-07-30 RX ADMIN — SODIUM CHLORIDE 100 MILLILITER(S): 9 INJECTION INTRAMUSCULAR; INTRAVENOUS; SUBCUTANEOUS at 23:00

## 2019-07-30 RX ADMIN — Medication 300 MILLIGRAM(S): at 21:04

## 2019-07-30 RX ADMIN — Medication 975 MILLIGRAM(S): at 22:00

## 2019-07-30 RX ADMIN — SODIUM CHLORIDE 100 MILLILITER(S): 9 INJECTION INTRAMUSCULAR; INTRAVENOUS; SUBCUTANEOUS at 14:00

## 2019-07-30 RX ADMIN — OXYCODONE HYDROCHLORIDE 10 MILLIGRAM(S): 5 TABLET ORAL at 07:29

## 2019-07-30 RX ADMIN — OXYCODONE HYDROCHLORIDE 10 MILLIGRAM(S): 5 TABLET ORAL at 14:20

## 2019-07-30 RX ADMIN — Medication 100 MILLIGRAM(S): at 13:21

## 2019-07-30 RX ADMIN — Medication 975 MILLIGRAM(S): at 14:20

## 2019-07-30 RX ADMIN — OXYCODONE HYDROCHLORIDE 10 MILLIGRAM(S): 5 TABLET ORAL at 23:32

## 2019-07-30 RX ADMIN — OXYCODONE HYDROCHLORIDE 10 MILLIGRAM(S): 5 TABLET ORAL at 13:21

## 2019-07-30 RX ADMIN — HYDROMORPHONE HYDROCHLORIDE 0.5 MILLIGRAM(S): 2 INJECTION INTRAMUSCULAR; INTRAVENOUS; SUBCUTANEOUS at 16:51

## 2019-07-30 RX ADMIN — HYDROMORPHONE HYDROCHLORIDE 0.5 MILLIGRAM(S): 2 INJECTION INTRAMUSCULAR; INTRAVENOUS; SUBCUTANEOUS at 16:36

## 2019-07-30 RX ADMIN — Medication 650 MILLIGRAM(S): at 07:29

## 2019-07-30 RX ADMIN — SODIUM CHLORIDE 100 MILLILITER(S): 9 INJECTION, SOLUTION INTRAVENOUS at 12:04

## 2019-07-30 NOTE — DISCHARGE NOTE PROVIDER - NSDCFUADDAPPT_GEN_ALL_CORE_FT
If you have a question about your medicine, if you feel "off" or are having a "bad day," or if you need to see or speak with a doctor or if you need emergency services, please call our Orthopedic Navigator Help Line at [phone]. Your  is Shanell Lynne NP. You can call 24/7 - there will be a medical provider available to help you.    Follow up with your surgeon in 2 weeks (call dr kaur office for appointment)      Call and schedule a follow up appointment with your primary care physician for repeat blood work (CBC and BMP) for post hospital discharge follow-up care.    Return to ER for shortness of breath/calf tenderness.

## 2019-07-30 NOTE — OCCUPATIONAL THERAPY INITIAL EVALUATION ADULT - ADDITIONAL COMMENTS
Pre op assessment confirmed- Pt lives in a second floor apt of an rented house but pt will be able to stay on the main floor for one month post surgery. There no step at the entry and amrita rails,  close and able to be reached simultaneously, to go to the second floor. Pt has a tub/shower combo c fixed and retractable shower heads and regular toilet seat in BR. 3-1commode will fit well in the BR. Pt is R handed and drives. he landlord will be available to assist pt in post -op care upon discharge home.

## 2019-07-30 NOTE — OCCUPATIONAL THERAPY INITIAL EVALUATION ADULT - IMPAIRED TRANSFERS: SIT/STAND, REHAB EVAL
decreased ROM/pain/decreased strength

## 2019-07-30 NOTE — PHYSICAL THERAPY INITIAL EVALUATION ADULT - ADDITIONAL COMMENTS
Confirmed post-op: Pt lives in a second floor apt of an rented house but pt will be able to stay on the main floor for one month post surgery. There no step at the entry and amrita rails,  close and able to be reached simultaneously, to go to the second floor. Pt has a tub/shower combo c fixed and retractable shower heads and regular toilet seat in BR. 3-1commode will fit well in the BR. Pt is R handed and drives. he landlord will be available to assist pt in post -op care upon discharge home. Pt received DME

## 2019-07-30 NOTE — DISCHARGE NOTE PROVIDER - NSDCFUADDINST_GEN_ALL_CORE_FT
Keep aquacell Dressing Clean, Dry and Intact. May shower on POD#5 with Dressing on. Dressing may be removed on POD#7. Please do not scrub, soak, peel or pick at the dressing. No creams, lotions, or oils over dressing. May shower on POD#5 and let water run over dressing, no baths. Pat dry once out of shower.     If dressing is saturated from border to border. Remove dressing and cover with clean dry dressing.     Hip replacement precautions: Abduction pillow. Elevate the leg (while keeping hip precautions) as often as possible to help control swelling.

## 2019-07-30 NOTE — PHYSICAL THERAPY INITIAL EVALUATION ADULT - PASSIVE RANGE OF MOTION EXAMINATION, REHAB EVAL
no R hip flexion past 90, no right internal rotation, and no right hip adduction past neutral secondary to hip precautions/no Passive ROM deficits were identified

## 2019-07-30 NOTE — ASU PREOP CHECKLIST - COMMENTS
pt states he is also allergic to "a hypertensive water pill but unsure of the name"  dr. benitez and dr. kaur aware

## 2019-07-30 NOTE — CONSULT NOTE ADULT - SUBJECTIVE AND OBJECTIVE BOX
CRISTAL BERGMAN is a 64y Male s/p RIGHT TOTAL HIP REPLACEMENT   by Dr. Hernandez on 7/29/19. complains of postop pain; patient tolerated surgery well.    PMH: w/ h/o HTN (hypertension)  Schizophrenia    ROS: no fevers, chills, headache, dizziness, lightheadedness, chest pain, palpitations, shortness of breath, cough, phlegm, wheezing, abdominal pain, nausea, vomiting, diarrhea, constipation or urinary symptoms     PSH: H/O resection of rib  S/P tendon repair  Ankle fracture    SH: does not smoke or drink at this time    ALLERGIES: Haldol (Unknown)  penicillin (Unknown)  strawberry (Rash)    MEDS: acetaminophen   Tablet .. 975 milliGRAM(s) Oral every 8 hours  aluminum hydroxide/magnesium hydroxide/simethicone Suspension 30 milliLiter(s) Oral four times a day PRN  ARIPiprazole 30 milliGRAM(s) Oral daily  docusate sodium 100 milliGRAM(s) Oral three times a day  ferrous    sulfate 325 milliGRAM(s) Oral three times a day with meals  folic acid 1 milliGRAM(s) Oral daily  HYDROmorphone  Injectable 0.5 milliGRAM(s) IV Push every 4 hours PRN  losartan 100 milliGRAM(s) Oral daily  magnesium hydroxide Suspension 30 milliLiter(s) Oral daily PRN  multivitamin 1 Tablet(s) Oral daily  ondansetron Injectable 4 milliGRAM(s) IV Push every 6 hours PRN  oxyCODONE    IR 5 milliGRAM(s) Oral every 4 hours PRN  oxyCODONE    IR 10 milliGRAM(s) Oral every 4 hours PRN  pantoprazole    Tablet 40 milliGRAM(s) Oral before breakfast  polyethylene glycol 3350 17 Gram(s) Oral daily  senna 2 Tablet(s) Oral at bedtime PRN  sodium chloride 0.9%. 1000 milliLiter(s) IV Continuous <Continuous>  vancomycin  IVPB 1500 milliGRAM(s) IV Intermittent once    PHYS:  T(C): 36.3 (07-30-19 @ 15:48), Max: 37.2 (07-30-19 @ 06:38)  HR: 109 (07-30-19 @ 16:30) (98 - 112)  BP: 118/81 (07-30-19 @ 16:30) (100/69 - 132/81)  RR: 17 (07-30-19 @ 16:30) (14 - 18)  SpO2: 96% (07-30-19 @ 16:30) (92% - 98%)  HEENT unremarkable  neck no JVD or bruit  lungs, clear bilaterally  heart, regular rhythm, normal S1, S2, no murmurs, rubs or gallops  abdomen, soft, non tender, no organomegaly, normal bowel sounds  no cyanosis, clubbing, edema or calf tenderness  neuro, grossly normal                         13.4   13.59 )-----------( 163      ( 30 Jul 2019 11:50 )             42.0     Assessment and Plan: status post right total hip replacement; Hypertension; postop leucocytosis; history of schizophrenia; pain control; deep vein thrombophlebitis prophylaxis; physical therapy; bowel regimen; nutrition support; follow up labs; will follow. CRISTAL BERGMAN is a 64y Male s/p RIGHT TOTAL HIP REPLACEMENT   by Dr. Hernandez on 7/30/19. complains of postop pain; patient tolerated surgery well.    PMH: w/ h/o HTN (hypertension)  Schizophrenia    ROS: no fevers, chills, headache, dizziness, lightheadedness, chest pain, palpitations, shortness of breath, cough, phlegm, wheezing, abdominal pain, nausea, vomiting, diarrhea, constipation or urinary symptoms     PSH: H/O resection of rib  S/P tendon repair  Ankle fracture    SH: does not smoke or drink at this time    ALLERGIES: Haldol (Unknown)  penicillin (Unknown)  strawberry (Rash)    MEDS: acetaminophen   Tablet .. 975 milliGRAM(s) Oral every 8 hours  aluminum hydroxide/magnesium hydroxide/simethicone Suspension 30 milliLiter(s) Oral four times a day PRN  ARIPiprazole 30 milliGRAM(s) Oral daily  docusate sodium 100 milliGRAM(s) Oral three times a day  ferrous    sulfate 325 milliGRAM(s) Oral three times a day with meals  folic acid 1 milliGRAM(s) Oral daily  HYDROmorphone  Injectable 0.5 milliGRAM(s) IV Push every 4 hours PRN  losartan 100 milliGRAM(s) Oral daily  magnesium hydroxide Suspension 30 milliLiter(s) Oral daily PRN  multivitamin 1 Tablet(s) Oral daily  ondansetron Injectable 4 milliGRAM(s) IV Push every 6 hours PRN  oxyCODONE    IR 5 milliGRAM(s) Oral every 4 hours PRN  oxyCODONE    IR 10 milliGRAM(s) Oral every 4 hours PRN  pantoprazole    Tablet 40 milliGRAM(s) Oral before breakfast  polyethylene glycol 3350 17 Gram(s) Oral daily  senna 2 Tablet(s) Oral at bedtime PRN  sodium chloride 0.9%. 1000 milliLiter(s) IV Continuous <Continuous>  vancomycin  IVPB 1500 milliGRAM(s) IV Intermittent once    PHYS:  T(C): 36.3 (07-30-19 @ 15:48), Max: 37.2 (07-30-19 @ 06:38)  HR: 109 (07-30-19 @ 16:30) (98 - 112)  BP: 118/81 (07-30-19 @ 16:30) (100/69 - 132/81)  RR: 17 (07-30-19 @ 16:30) (14 - 18)  SpO2: 96% (07-30-19 @ 16:30) (92% - 98%)  HEENT unremarkable  neck no JVD or bruit  lungs, clear bilaterally  heart, regular rhythm, normal S1, S2, no murmurs, rubs or gallops  abdomen, soft, non tender, no organomegaly, normal bowel sounds  no cyanosis, clubbing, edema or calf tenderness  neuro, grossly normal                         13.4   13.59 )-----------( 163      ( 30 Jul 2019 11:50 )             42.0     Assessment and Plan: status post right total hip replacement; Hypertension; postop leucocytosis; history of schizophrenia; pain control; deep vein thrombophlebitis prophylaxis; physical therapy; bowel regimen; nutrition support; follow up labs; will follow.

## 2019-07-30 NOTE — DISCHARGE NOTE PROVIDER - HOSPITAL COURSE
64yMale with history of Right Hip Osteoarthritis presenting for Right GOLDEN by David Alva on 7/30/19. Risk and benefits of surgery were explained to the patient.The patient understood and agreed to proceed with surgery. Patient underwent the procedure with no intraoperative complications. Pt was brought in stable condition to the PACU. Once stable in PACU, pt was brought to the floor. During hospital stay pt was followed by Medicine during this admission. Pt had an uneventful hospital course. Pt is stable for discharge to [rehab/home] on POD#[ ] 64yMale with history of Right Hip Osteoarthritis presenting for Right GOLDEN by David Alva on 7/30/19. Risk and benefits of surgery were explained to the patient.The patient understood and agreed to proceed with surgery. Patient underwent the procedure with no intraoperative complications. Pt was brought in stable condition to the PACU. Once stable in PACU, pt was brought to the floor. During hospital stay pt was followed by Medicine during this admission. Pt had an uneventful hospital course. Pt is stable for discharge to [rehab/home] on POD#1

## 2019-07-30 NOTE — PHYSICAL THERAPY INITIAL EVALUATION ADULT - GAIT TRAINING, PT EVAL
Pt will independently ambulate 200feet with rolling walker without loss of balance, by 2-3days. Pt will independently negotiate 6 steps with B rails step to pattern  to enter and exit home, by 2-3 days

## 2019-07-30 NOTE — DISCHARGE NOTE PROVIDER - NSDCACTIVITY_GEN_ALL_CORE
Do not drive or operate machinery/Showering allowed/No heavy lifting/straining/Walking - Indoors allowed/Do not make important decisions/Walking - Outdoors allowed/Stairs allowed

## 2019-07-30 NOTE — DISCHARGE NOTE PROVIDER - CARE PROVIDER_API CALL
Herbie Hernandez)  Orthopaedic Surgery  [address] [place] [address]  Phone: [phone]  Fax: [phone]  Follow Up Time:

## 2019-07-30 NOTE — PHYSICAL THERAPY INITIAL EVALUATION ADULT - BED MOBILITY TRAINING, PT EVAL
Pt will independently perform all aspects of bed mobility to help prevent pressure ulcers, by 2 days

## 2019-07-31 ENCOUNTER — TRANSCRIPTION ENCOUNTER (OUTPATIENT)
Age: 64
End: 2019-07-31

## 2019-07-31 VITALS
TEMPERATURE: 98 F | SYSTOLIC BLOOD PRESSURE: 140 MMHG | DIASTOLIC BLOOD PRESSURE: 82 MMHG | RESPIRATION RATE: 17 BRPM | OXYGEN SATURATION: 95 % | HEART RATE: 88 BPM

## 2019-07-31 LAB
ANION GAP SERPL CALC-SCNC: 5 MMOL/L — SIGNIFICANT CHANGE UP (ref 5–17)
BUN SERPL-MCNC: 18 MG/DL — SIGNIFICANT CHANGE UP (ref 7–23)
CALCIUM SERPL-MCNC: 8.2 MG/DL — LOW (ref 8.5–10.1)
CHLORIDE SERPL-SCNC: 109 MMOL/L — HIGH (ref 96–108)
CO2 SERPL-SCNC: 25 MMOL/L — SIGNIFICANT CHANGE UP (ref 22–31)
CREAT SERPL-MCNC: 0.89 MG/DL — SIGNIFICANT CHANGE UP (ref 0.5–1.3)
GLUCOSE SERPL-MCNC: 139 MG/DL — HIGH (ref 70–99)
HCT VFR BLD CALC: 34.3 % — LOW (ref 39–50)
HGB BLD-MCNC: 11.1 G/DL — LOW (ref 13–17)
MCHC RBC-ENTMCNC: 28 PG — SIGNIFICANT CHANGE UP (ref 27–34)
MCHC RBC-ENTMCNC: 32.4 GM/DL — SIGNIFICANT CHANGE UP (ref 32–36)
MCV RBC AUTO: 86.4 FL — SIGNIFICANT CHANGE UP (ref 80–100)
NRBC # BLD: 0 /100 WBCS — SIGNIFICANT CHANGE UP (ref 0–0)
PLATELET # BLD AUTO: 141 K/UL — LOW (ref 150–400)
POTASSIUM SERPL-MCNC: 4.2 MMOL/L — SIGNIFICANT CHANGE UP (ref 3.5–5.3)
POTASSIUM SERPL-SCNC: 4.2 MMOL/L — SIGNIFICANT CHANGE UP (ref 3.5–5.3)
RBC # BLD: 3.97 M/UL — LOW (ref 4.2–5.8)
RBC # FLD: 15.7 % — HIGH (ref 10.3–14.5)
SODIUM SERPL-SCNC: 139 MMOL/L — SIGNIFICANT CHANGE UP (ref 135–145)
WBC # BLD: 13.39 K/UL — HIGH (ref 3.8–10.5)
WBC # FLD AUTO: 13.39 K/UL — HIGH (ref 3.8–10.5)

## 2019-07-31 PROCEDURE — 99238 HOSP IP/OBS DSCHRG MGMT 30/<: CPT

## 2019-07-31 RX ORDER — DOCUSATE SODIUM 100 MG
1 CAPSULE ORAL
Qty: 0 | Refills: 0 | DISCHARGE
Start: 2019-07-31

## 2019-07-31 RX ORDER — MAGNESIUM HYDROXIDE 400 MG/1
30 TABLET, CHEWABLE ORAL
Qty: 0 | Refills: 0 | DISCHARGE
Start: 2019-07-31

## 2019-07-31 RX ORDER — KETOROLAC TROMETHAMINE 30 MG/ML
30 SYRINGE (ML) INJECTION ONCE
Refills: 0 | Status: DISCONTINUED | OUTPATIENT
Start: 2019-07-31 | End: 2019-07-31

## 2019-07-31 RX ORDER — POLYETHYLENE GLYCOL 3350 17 G/17G
17 POWDER, FOR SOLUTION ORAL
Qty: 0 | Refills: 0 | DISCHARGE
Start: 2019-07-31

## 2019-07-31 RX ORDER — CELECOXIB 200 MG/1
1 CAPSULE ORAL
Qty: 30 | Refills: 0
Start: 2019-07-31 | End: 2019-08-29

## 2019-07-31 RX ORDER — OXYCODONE HYDROCHLORIDE 5 MG/1
1 TABLET ORAL
Qty: 30 | Refills: 0
Start: 2019-07-31 | End: 2019-08-04

## 2019-07-31 RX ORDER — ACETAMINOPHEN 500 MG
3 TABLET ORAL
Qty: 0 | Refills: 0 | DISCHARGE
Start: 2019-07-31

## 2019-07-31 RX ORDER — PANTOPRAZOLE SODIUM 20 MG/1
1 TABLET, DELAYED RELEASE ORAL
Qty: 30 | Refills: 0
Start: 2019-07-31 | End: 2019-08-29

## 2019-07-31 RX ORDER — ASPIRIN/CALCIUM CARB/MAGNESIUM 324 MG
1 TABLET ORAL
Qty: 60 | Refills: 0
Start: 2019-07-31 | End: 2019-08-29

## 2019-07-31 RX ADMIN — OXYCODONE HYDROCHLORIDE 10 MILLIGRAM(S): 5 TABLET ORAL at 12:20

## 2019-07-31 RX ADMIN — OXYCODONE HYDROCHLORIDE 10 MILLIGRAM(S): 5 TABLET ORAL at 06:30

## 2019-07-31 RX ADMIN — Medication 101.6 MILLIGRAM(S): at 05:37

## 2019-07-31 RX ADMIN — Medication 30 MILLILITER(S): at 12:20

## 2019-07-31 RX ADMIN — Medication 975 MILLIGRAM(S): at 14:38

## 2019-07-31 RX ADMIN — PANTOPRAZOLE SODIUM 40 MILLIGRAM(S): 20 TABLET, DELAYED RELEASE ORAL at 05:37

## 2019-07-31 RX ADMIN — CELECOXIB 200 MILLIGRAM(S): 200 CAPSULE ORAL at 12:22

## 2019-07-31 RX ADMIN — Medication 975 MILLIGRAM(S): at 06:20

## 2019-07-31 RX ADMIN — Medication 100 MILLIGRAM(S): at 05:37

## 2019-07-31 RX ADMIN — Medication 325 MILLIGRAM(S): at 05:39

## 2019-07-31 RX ADMIN — Medication 1 TABLET(S): at 12:19

## 2019-07-31 RX ADMIN — LOSARTAN POTASSIUM 100 MILLIGRAM(S): 100 TABLET, FILM COATED ORAL at 05:39

## 2019-07-31 RX ADMIN — Medication 1 MILLIGRAM(S): at 12:20

## 2019-07-31 RX ADMIN — OXYCODONE HYDROCHLORIDE 10 MILLIGRAM(S): 5 TABLET ORAL at 00:30

## 2019-07-31 RX ADMIN — OXYCODONE HYDROCHLORIDE 10 MILLIGRAM(S): 5 TABLET ORAL at 05:37

## 2019-07-31 RX ADMIN — Medication 30 MILLIGRAM(S): at 09:10

## 2019-07-31 RX ADMIN — CELECOXIB 200 MILLIGRAM(S): 200 CAPSULE ORAL at 13:22

## 2019-07-31 RX ADMIN — Medication 30 MILLIGRAM(S): at 08:52

## 2019-07-31 RX ADMIN — Medication 325 MILLIGRAM(S): at 12:19

## 2019-07-31 RX ADMIN — OXYCODONE HYDROCHLORIDE 10 MILLIGRAM(S): 5 TABLET ORAL at 13:20

## 2019-07-31 RX ADMIN — Medication 100 MILLIGRAM(S): at 14:38

## 2019-07-31 RX ADMIN — POLYETHYLENE GLYCOL 3350 17 GRAM(S): 17 POWDER, FOR SOLUTION ORAL at 12:20

## 2019-07-31 RX ADMIN — Medication 325 MILLIGRAM(S): at 07:53

## 2019-07-31 RX ADMIN — Medication 975 MILLIGRAM(S): at 05:37

## 2019-07-31 NOTE — DISCHARGE NOTE NURSING/CASE MANAGEMENT/SOCIAL WORK - NSDCFUADDAPPT_GEN_ALL_CORE_FT
If you have a question about your medicine, if you feel "off" or are having a "bad day," or if you need to see or speak with a doctor or if you need emergency services, please call our Orthopedic Navigator Help Line at [phone]. Your  is Shanell Lynne NP. You can call 24/7 - there will be a medical provider available to help you.    Follow up with your surgeon in 2 weeks (call dr kaur office for appointment)      Call and schedule a follow up appointment with your primary care physician for repeat blood work (CBC and BMP) for post hospital discharge follow-up care.    Return to ER for shortness of breath/calf tenderness.

## 2019-07-31 NOTE — DISCHARGE NOTE NURSING/CASE MANAGEMENT/SOCIAL WORK - NSDCDPATPORTLINK_GEN_ALL_CORE
You can access the [facility] Patient Portal, offered by [facility], by registering with the following website: [email or web address]

## 2019-07-31 NOTE — PROGRESS NOTE ADULT - SUBJECTIVE AND OBJECTIVE BOX
CRISTAL BERGMAN is a 64y Male s/p RIGHT TOTAL HIP REPLACEMENT  by Dr. Hernandez on 7/30/19. complains of postop pain; patient tolerated surgery well.    ROS: no pulmonary, cardiovascular, gastrointestinal, urological or neurological symptoms.     PHYS:  T(C): 36 (07-31-19 @ 07:29), Max: 37.1 (07-30-19 @ 11:22)  HR: 95 (07-31-19 @ 07:29) (56 - 112)  BP: 102/75 (07-31-19 @ 07:29) (100/69 - 130/87)  RR: 17 (07-31-19 @ 07:29) (14 - 17)  SpO2: 93% (07-31-19 @ 07:29) (92% - 98%)  vss; lungs, clear; heart, reg rhythm, no murmurs, rubs or gallops;   abd, soft, non tender, normal bowel sounds, no calf tenderness or edema                         11.1   13.39 )-----------( 141      ( 31 Jul 2019 06:18 )             34.3   07-31    139  |  109<H>  |  18  ----------------------------<  139<H>  4.2   |  25  |  0.89    Ca    8.2<L>      31 Jul 2019 06:18    Assessment and Plan: status post right total hip replacement; Hypertension; postop leucocytosis; postop anemia; random elevated glucose; history of schizophrenia; mild post op hypotension; intravenous saline bolus; pain control; deep vein thrombophlebitis prophylaxis; physical therapy; bowel regimen; nutrition support; follow up labs; will follow.
Post-op Check   POD#0 S/P Right GOLDEN   64yMale Patient seen and examined, Pain controlled  Patient Denies SOB, CP, N/V/D       PE: Right Hip/LE: Dressing C/D/I, Sensation/motor intact, DP 2+, FROM ankle/toes   B/L LE: Skin intact. +ROM hip/knee/ankle/toes. Ankle Dorsi/plantarflexion: 5/5. Calf: soft, compressible and nontender. DP/PT 2+ NVI                          13.4   13.59 )-----------( 163      ( 30 Jul 2019 11:50 )             42.0                 A: As above   P: Pain Control       DVT Prophylaxis      Incentive spirometry      Total hip precautions Reviewed       PT WBAT RLE      Isometric exercises      Discharge Planning      All the above discussed and understood by pt       Ortho to F/U
Patient is seen and examined at bedside. Denies CP/SOB/Dizziness/N/V/D/HA. Pain is controlled at rest. States 8/10 while ambulating.      Vital Signs Last 24 Hrs  T(C): 36 (31 Jul 2019 07:29), Max: 37.1 (30 Jul 2019 11:22)  T(F): 96.8 (31 Jul 2019 07:29), Max: 98.7 (30 Jul 2019 11:22)  HR: 95 (31 Jul 2019 07:29) (56 - 112)  BP: 102/75 (31 Jul 2019 07:29) (100/69 - 130/87)  BP(mean): --  RR: 17 (31 Jul 2019 07:29) (14 - 17)  SpO2: 93% (31 Jul 2019 07:29) (92% - 98%)    GEN: NAD  ABD: soft, NT/ND; no rebound or guarding;  Neurologic: AAOx3; CNS grossly intact; no focal deficits  Right hip: Aquacell Dressing C/D/I.   B/L LE's: Motor intact + EHL/FHL/TA/GS in the BL LE. Sensation is grossly intact B/L. Extremities warm B/L. Compartments soft, compressible B/L, no calf tenderness B/L. DP 2+ B/L.    Labs:                          11.1   13.39 )-----------( 141      ( 31 Jul 2019 06:18 )             34.3       07-31    139  |  109<H>  |  18  ----------------------------<  139<H>  4.2   |  25  |  0.89    Ca    8.2<L>      31 Jul 2019 06:18        A/P: Patient is a 64y y/o Male s/p right total hip arthroplasty, POD # 1  -wound care, isometric exercises, GI motility, new medications, hip precautions,  hospital course and discharge planning reviewed with pt  -Pain control/analgesia counseled. Dosing intervals and multimodal pain management. Toradol 30mg X 1 dose.   -Inc spirometry reviewed and counseled  -Venodynes/foot pumps  -PT/OT/WBAT  -Anticoagulation- asa 325mg bid  -medical collaboration: dr chery, appreciated  -DC planning: home today.
Pt feeling good.   Did PT earlier. Pain (stiffness) improved with ambulation.  DC home today

## 2019-08-01 ENCOUNTER — OUTPATIENT (OUTPATIENT)
Dept: OUTPATIENT SERVICES | Facility: HOSPITAL | Age: 64
LOS: 1 days | End: 2019-08-01
Payer: MEDICARE

## 2019-08-01 DIAGNOSIS — S82.899A OTHER FRACTURE OF UNSPECIFIED LOWER LEG, INITIAL ENCOUNTER FOR CLOSED FRACTURE: Chronic | ICD-10-CM

## 2019-08-01 DIAGNOSIS — Z98.890 OTHER SPECIFIED POSTPROCEDURAL STATES: Chronic | ICD-10-CM

## 2019-08-01 LAB — SURGICAL PATHOLOGY STUDY: SIGNIFICANT CHANGE UP

## 2019-08-01 PROCEDURE — G9001: CPT

## 2019-08-08 DIAGNOSIS — M16.11 UNILATERAL PRIMARY OSTEOARTHRITIS, RIGHT HIP: ICD-10-CM

## 2019-08-08 DIAGNOSIS — D72.829 ELEVATED WHITE BLOOD CELL COUNT, UNSPECIFIED: ICD-10-CM

## 2019-08-08 DIAGNOSIS — D62 ACUTE POSTHEMORRHAGIC ANEMIA: ICD-10-CM

## 2019-08-08 DIAGNOSIS — F20.9 SCHIZOPHRENIA, UNSPECIFIED: ICD-10-CM

## 2019-08-08 DIAGNOSIS — R73.9 HYPERGLYCEMIA, UNSPECIFIED: ICD-10-CM

## 2019-08-08 DIAGNOSIS — I95.81 POSTPROCEDURAL HYPOTENSION: ICD-10-CM

## 2019-08-08 DIAGNOSIS — I10 ESSENTIAL (PRIMARY) HYPERTENSION: ICD-10-CM

## 2019-08-16 DIAGNOSIS — Z71.89 OTHER SPECIFIED COUNSELING: ICD-10-CM

## 2019-08-16 PROBLEM — I10 ESSENTIAL (PRIMARY) HYPERTENSION: Chronic | Status: ACTIVE | Noted: 2019-07-16

## 2020-06-25 ENCOUNTER — APPOINTMENT (OUTPATIENT)
Dept: DERMATOLOGY | Facility: CLINIC | Age: 65
End: 2020-06-25
Payer: MEDICARE

## 2020-06-25 VITALS — WEIGHT: 230 LBS | HEIGHT: 73 IN | BODY MASS INDEX: 30.48 KG/M2

## 2020-06-25 DIAGNOSIS — L73.9 FOLLICULAR DISORDER, UNSPECIFIED: ICD-10-CM

## 2020-06-25 PROCEDURE — 11104 PUNCH BX SKIN SINGLE LESION: CPT

## 2020-06-25 PROCEDURE — 99203 OFFICE O/P NEW LOW 30 MIN: CPT | Mod: 25

## 2020-06-25 RX ORDER — ARIPIPRAZOLE 2 MG/1
TABLET ORAL
Refills: 0 | Status: ACTIVE | COMMUNITY

## 2020-06-25 NOTE — HISTORY OF PRESENT ILLNESS
[FreeTextEntry1] : Rash. [de-identified] : Began on the LE's approx. 1-2 weeks ago.  Slow progression.  Denies discomfort, itching or irritation.  No self tx.\par Patient feels he had a similar eruption on the UE's approx. 6-12 months ago, resolved.\par He has been tx'ed with Ambilify for 20 years, and losartan for 1 year.

## 2020-06-25 NOTE — ASSESSMENT
[FreeTextEntry1] : Rash - R/o Drug, ? T-cell proliferative dz / PLEVA, cannot r/o folliculitis.\par Education.\par Will discuss with patient when path is available.

## 2020-06-25 NOTE — PHYSICAL EXAM
[Alert] : alert [Oriented x 3] : ~L oriented x 3 [Well Nourished] : well nourished [Eyelids] : Eyelids [Ears] : Ears [Lips] : Lips [Neck] : Neck [FreeTextEntry3] : Erythematous papules, mildly indurated, diffusely on the LE's (proximal > distal), back and left abdomen.  UE's spared.

## 2020-07-06 ENCOUNTER — APPOINTMENT (OUTPATIENT)
Dept: DERMATOLOGY | Facility: CLINIC | Age: 65
End: 2020-07-06
Payer: MEDICARE

## 2020-07-06 DIAGNOSIS — R21 RASH AND OTHER NONSPECIFIC SKIN ERUPTION: ICD-10-CM

## 2020-07-06 PROCEDURE — 99024 POSTOP FOLLOW-UP VISIT: CPT

## 2020-07-06 NOTE — HISTORY OF PRESENT ILLNESS
[FreeTextEntry1] : Suture removal. [de-identified] : Abdomen well healed, without evidence of infection.\par Sutures removed.\par Path still pending.\par Will call patient with results when available.  He is mostly concerned of a drug eruption - clinically, I think this is unlikely.

## 2020-07-07 DIAGNOSIS — L56.8 OTHER SPECIFIED ACUTE SKIN CHANGES DUE TO ULTRAVIOLET RADIATION: ICD-10-CM

## 2020-07-07 LAB — CORE LAB BIOPSY: NORMAL

## 2020-09-23 ENCOUNTER — APPOINTMENT (OUTPATIENT)
Dept: DERMATOLOGY | Facility: CLINIC | Age: 65
End: 2020-09-23
Payer: MEDICARE

## 2020-09-23 VITALS — BODY MASS INDEX: 30.48 KG/M2 | HEIGHT: 73 IN | WEIGHT: 230 LBS

## 2020-09-23 DIAGNOSIS — L90.5 SCAR CONDITIONS AND FIBROSIS OF SKIN: ICD-10-CM

## 2020-09-23 PROCEDURE — 99024 POSTOP FOLLOW-UP VISIT: CPT
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
## 2021-12-03 NOTE — OCCUPATIONAL THERAPY INITIAL EVALUATION ADULT - HEALTH SCREEN CRITERIA
RE: DR. SCHROEDER - DOS: 12/20/21 - CANCELLATION  Received: Today  Vandana Ledesma  Patient procedure cancelled      yes

## 2022-03-02 ENCOUNTER — APPOINTMENT (OUTPATIENT)
Dept: PULMONOLOGY | Facility: CLINIC | Age: 67
End: 2022-03-02
Payer: MEDICARE

## 2022-03-02 VITALS
DIASTOLIC BLOOD PRESSURE: 87 MMHG | TEMPERATURE: 97.8 F | RESPIRATION RATE: 16 BRPM | OXYGEN SATURATION: 93 % | BODY MASS INDEX: 34.21 KG/M2 | HEART RATE: 78 BPM | SYSTOLIC BLOOD PRESSURE: 130 MMHG | HEIGHT: 73 IN | WEIGHT: 258.1 LBS

## 2022-03-02 PROCEDURE — 99205 OFFICE O/P NEW HI 60 MIN: CPT
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
## 2022-03-02 NOTE — CONSULT LETTER
[Dear  ___] : Dear  [unfilled], [Consult Letter:] : I had the pleasure of evaluating your patient, [unfilled]. [Please see my note below.] : Please see my note below. [Consult Closing:] : Thank you very much for allowing me to participate in the care of this patient.  If you have any questions, please do not hesitate to contact me. [FreeTextEntry3] : Sincerely,\par \par Gayla Bedolla MD\par [facility] Physician [facility]\par Pulmonary Medicine\par tel: [phone]\par fax: [phone]\par

## 2022-03-02 NOTE — ASSESSMENT
[FreeTextEntry1] : Mr. CRISTAL BERGMAN is a 66 year old former smoker (>30 pack year hx, quit 10/2021) here for evaluation.\par \par #Dysphagia/globus sensation - advised to f/u with ENT and GI. Referral provided.\par Patient dose note some reflux - will also give a trial of Protonix\par \par #Former smoker - quit 10/2021\par -- LDCT 10/2021, repeat in one year\par \par #PRYOR - may be related to underlying airway disease vs obesity/deconditioning\par -- obtain PFTs\par -- trial of Albuterol\par \par All questions answered. Patient in agreement with plan. \par Follow up in 6w  or sooner if needed.\par

## 2022-03-09 ENCOUNTER — RESULT REVIEW (OUTPATIENT)
Age: 67
End: 2022-03-09

## 2022-03-09 ENCOUNTER — APPOINTMENT (OUTPATIENT)
Dept: OTOLARYNGOLOGY | Facility: CLINIC | Age: 67
End: 2022-03-09
Payer: MEDICARE

## 2022-03-09 VITALS
HEART RATE: 89 BPM | BODY MASS INDEX: 34.19 KG/M2 | SYSTOLIC BLOOD PRESSURE: 120 MMHG | HEIGHT: 73 IN | DIASTOLIC BLOOD PRESSURE: 83 MMHG | WEIGHT: 258 LBS

## 2022-03-09 DIAGNOSIS — Z82.2 FAMILY HISTORY OF DEAFNESS AND HEARING LOSS: ICD-10-CM

## 2022-03-09 PROCEDURE — 31575 DIAGNOSTIC LARYNGOSCOPY: CPT

## 2022-03-09 PROCEDURE — 99204 OFFICE O/P NEW MOD 45 MIN: CPT | Mod: 25

## 2022-03-09 RX ORDER — FLUTICASONE PROPIONATE 0.5 MG/G
0.05 CREAM TOPICAL TWICE DAILY
Qty: 1 | Refills: 3 | Status: COMPLETED | COMMUNITY
Start: 2020-07-07 | End: 2022-03-09

## 2022-03-09 NOTE — ADDENDUM
[FreeTextEntry1] : Documented by Bharat Schwartz acting as scribe for Dr. Sim on 03/09/2022.\par \par All Medical record entries made by the Scribe were at my, Dr. Sim, direction and personally dictated by me on 03/09/2022. I have reviewed the chart and agree that the record accurately reflects my personal performance of the history, physical exam, assessment and plan. I have also personally directed, reviewed, and agreed with the discharge instructions.

## 2022-03-09 NOTE — CONSULT LETTER
[Dear  ___] : Dear  [unfilled], [Consult Letter:] : I had the pleasure of evaluating your patient, [unfilled]. [Please see my note below.] : Please see my note below. [Consult Closing:] : Thank you very much for allowing me to participate in the care of this patient.  If you have any questions, please do not hesitate to contact me. [Sincerely,] : Sincerely, [FreeTextEntry3] : Ricardo Sim MD FACS

## 2022-03-09 NOTE — REVIEW OF SYSTEMS
[Throat Clearing] : throat clearing [Throat Dryness] : throat dryness [Throat Itching] : throat itching [Negative] : Heme/Lymph [FreeTextEntry1] : difficulty swallowing

## 2022-03-09 NOTE — HISTORY OF PRESENT ILLNESS
[de-identified] : The patient presents with h/o reflux. Pt presents today c/o dry mouth, globus sensation, dysphagia. Pt reports a feeling of air not getting to the lungs, but his pulmonologist has told him that air is getting to the lungs. Pt also has a raspy voice, and admits to snoring along with being tired throughout the day. Is not on CPAP. Former smoker, quit 6 months ago. Denies changes in hearing.

## 2022-03-09 NOTE — ASSESSMENT
[FreeTextEntry1] : Reviewed and reconciled medications, allergies, PMHx, PSHx, SocHx, FMHx. \par \par h/o reflux\par dry mouth\par dysphagia\par globus sensation\par snoring\par Extreme narrowing of airway at the level of tonsils and soft palate. \par Swelling of cords and false cords. \par Edema and swelling arytenoids, post cricoid, and intra arytenoid. \par \par Plan:\par Flexible Fiberoptic Laryngoscopy. Reflux diet sheet provided. Videostrobe. FEEST. MBS. Sleep study. FU after tests.

## 2022-03-09 NOTE — PHYSICAL EXAM
[Hearing Sage Test (Tuning Fork On Forehead)] : no lateralization of tone [Normal] : lingual tonsils are normal [Midline] : trachea located in midline position [FreeTextEntry1] : deviation of septum- midportion to left, along the floor to right [de-identified] : class 3  [de-identified] : large floppy uvula. dry mouth, type 3 oral cavity [FreeTextEntry2] : Sinuses nontender to percussion. Sensations intact.  [de-identified] : PERRL

## 2022-03-09 NOTE — PROCEDURE
[Dysphagia] : dysphagia not clearly evaluated by indirect laryngoscopy [Complicated Symptoms] : complicated symptoms requiring more thorough examination than provided by mirror [Globus] : globus [None] : none [Flexible Endoscope] : examined with the flexible endoscope [de-identified] : Procedure: Flexible Fiberoptic Laryngoscopy: Risks, benefits, and alternatives of flexible endoscopy were explained to the patient. The patient gave oral consent to proceed. The flexible scope was inserted into the right nasal cavity and advanced towards the nasopharynx. Visualized mucosa over the turbinates and septum were as describe above.  The nasopharynx was clear. Hypopharynx was also without lesion or edema. Larynx was mobile without lesions. Extreme narrowing of airway at the level of tonsils and soft palate. Swelling of cords and false cords. Edema and swelling arytenoids, post cricoid, and intra arytenoid. No pooling, or obvious lesions or growths.  Base of tongue was within normal limits.

## 2022-03-15 ENCOUNTER — APPOINTMENT (OUTPATIENT)
Dept: OTOLARYNGOLOGY | Facility: CLINIC | Age: 67
End: 2022-03-15

## 2022-03-18 ENCOUNTER — OUTPATIENT (OUTPATIENT)
Dept: OUTPATIENT SERVICES | Facility: HOSPITAL | Age: 67
LOS: 1 days | End: 2022-03-18
Payer: MEDICARE

## 2022-03-18 ENCOUNTER — NON-APPOINTMENT (OUTPATIENT)
Age: 67
End: 2022-03-18

## 2022-03-18 DIAGNOSIS — R13.12 DYSPHAGIA, OROPHARYNGEAL PHASE: ICD-10-CM

## 2022-03-18 DIAGNOSIS — S82.899A OTHER FRACTURE OF UNSPECIFIED LOWER LEG, INITIAL ENCOUNTER FOR CLOSED FRACTURE: Chronic | ICD-10-CM

## 2022-03-18 DIAGNOSIS — Z98.890 OTHER SPECIFIED POSTPROCEDURAL STATES: Chronic | ICD-10-CM

## 2022-03-18 DIAGNOSIS — K21.9 GASTRO-ESOPHAGEAL REFLUX DISEASE WITHOUT ESOPHAGITIS: ICD-10-CM

## 2022-03-18 PROCEDURE — 74230 X-RAY XM SWLNG FUNCJ C+: CPT | Mod: 26

## 2022-03-18 NOTE — ASSESSMENT
[FreeTextEntry1] : MODIFIED BARIUM SWALLOW STUDY\par \par Date of Report: 03/18/2022\par Date of Evaluation: 03/18/2022\par Patient Name: Quang Telles\par YOB: 1955\par Date of Onset: ongoing \par Primary Diagnosis: Pharyngeal Dysphagia \par Treatment Diagnosis: Pharyngeal Dysphagia\par Referring Physician: Dr. Ricardo Sim\par \par Impressions/Results: There was no laryngeal penetration/aspiration observed for pureed, solid, mildly thick liquids or thin liquids. There was an incidental finding of bridging osteophytes at level C3, C4, and C5, as per Radiologist’s report, which did not impinge on bolus passage.\par \par Recommendations:\par 1) The patient was advised to consume a regular solid with thin liquid diet, as tolerated.\par 2) The patient was advised to allow for swallow between intakes and to consume small bites and small, single cup sips at a decreased rate of consumption.\par 3) The patient was advised to position himself upright (no <90 degrees) during and after eating for 30 minutes and to maintain reflux precautions\par 4) Swallowing therapy is not warranted at this time\par 5) Follow up with referring MD as directed. \par \par History: This 66 year old male was seen this morning for a Modified Barium Swallow Study to: _x__rule out aspiration; __x_assess for diet texture change as appropriate; and/or _x__ explore positional strategies and/or compensatory techniques to eliminate penetration/aspiration. \par \par Reason for Referral: To determine patient's ability to safely tolerate an oral diet.\par \par The physician ordered this procedure because he wants the patient to meet their nutrition/hydration needs by mouth without compromising respiratory status. The patient arrived to today’s evaluation reporting ongoing dysphagia for the past several months marked by feelings of pharyngeal stasis and intermittent coughing with and without PO. Upon clinician questioning, the patient is denying any unintentional weight loss. He is further denying odynophagia, change in breathing pattern during or following meals, and denied any recent/recurrent pneumonia. It should be noted that the patient is scheduled for a videostroboscopy and FEES on 3/23/22.\par \par Current Nutritional Intake: Per patient’s report, he has been consuming a regular solid with thin liquid diet.\par \par Medical History: As per patient’s report and chart review, his medical and surgical history are significant for:\par \par Active Problems\par Cicatrix (709.2) (L90.5)\par PRYOR (dyspnea on exertion) (786.09) (R06.00)\par Folliculitis (704.8) (L73.9)\par Former smoker (V15.82) (Z87.891)\par Photodermatitis (692.72) (L56.8)\par Rash (782.1) (R21)\par \par Past Medical History\par History of hypertension (V12.59) (Z86.79)\par \par Surgical History\par History of Ankle Surgery\par History of Hip Surgery Right\par \par Current Respiratory Status: __x_ Normal ___ Tracheostomy Tube \par \par ASSESSMENT\par \par The patient was assessed standing in the lateral and AP planes in the [facility], with Radiologist present. \par The patient was _x_ alert ___lethargic _x__cooperative ___uncooperative.\par Secretion management was __x_adequate ___inadequate. \par \par Consistencies Administered: \par Solids: _x__ Regular ___Soft & bite-sized ___Minced & Moist _x__Pureed\par Liquids: _x__ Thin __x_ Mildly thick liquids (previously Nectar Thick) ___Moderately thick liquids (previously Honey Thick)\par __x_ single cup sips _x__ consecutive cup sips _x_ teaspoon \par \par SUMMARY & IMPRESSION\par The patient demonstrated the following:\par \par Preliminary Fluoroscopy reveals:\par 1) At baseline, the patient demonstrated velopharyngeal movement that was WFL.\par 2) At baseline, the patient demonstrated a timely pharyngeal swallow trigger and adequate hyolaryngeal elevation and excursion. \par \par Videofluoroscopic Evaluation Reveals:\par \par 1) The patient demonstrated functional oral management of pureed, regular solid, mildly thick liquids, and thin liquid textures marked by adequate oral acceptance with adequate bolus collection, transfer, and AP transit time. Trace lingual and palatal residue observed post primary swallow which cleared with a spontaneous re-swallow.\par 2) The patient demonstrated a functional pharyngeal phase of the swallow for pureed, regular solid, mildly thick liquids, and thin liquid textures marked by a timely pharyngeal swallow trigger with adequate base of tongue retraction, adequate epiglottic deflection, adequate hyolaryngeal elevation/excursion, and adequate pharyngeal contractility resulting in trace stasis along the base of tongue, in the vallecula, along the posterior pharyngeal wall, and in the pyriforms which cleared with a spontaneous re-swallow. No laryngeal penetration/aspiration visualized.\par 3) Of note: An esophageal screen was performed. The patient was given a barium tablet in the lateral and AP views. The barium tablet was observed to course through the oropharynx and esophagus without any evidence of hold up. This cannot be considered a formal assessment of the esophagus. \par 4) Of note: An incidental finding of bridging osteophytes visualized at level C4, C5, C6, as per Radiologist’s report, which did not impinge on bolus passage.\par \par Aspiration - Penetration Scale: 1- Pureed, regular solid, mildly thick liquids and thin liquids \par \par Aspiration - Penetration Scale ([place] et al Dysphagia 11:93-98 (April 1996), Aspiration-Penetration Scale)\par 1. Material does not enter the airway\par 2. Material enters the airway, remains above the vocal folds, and is\par ejected from the airway\par 3. Material enters the airway, remains above the vocal folds, and is not\par ejected\par 4. Material enters the airway, contacts the vocal folds, and is ejected\par from the airway\par 5. Material enters the airway, contacts the vocal folds, and is not ejected\par from the airway\par 6. Material enters the airway, passes below the vocal folds and is ejected\par into the larynx or out of the airway\par 7. Material enters the airway, passes below the vocal folds, and is not\par ejected from the trachea despite effort\par 8. Material enters the airway, passes below the vocal folds, and no effort\par is made to eject\par \par The above results and recommendations have been discussed with the patient, who verbalized full understanding.\par \par Should you have any additional concerns, please contact the Center at [phone].\par \par Shavon Stanton M.S. CCC-SLP\par Speech-Language Pathologist. \par

## 2022-03-21 PROCEDURE — 92611 MOTION FLUOROSCOPY/SWALLOW: CPT

## 2022-03-21 PROCEDURE — 74230 X-RAY XM SWLNG FUNCJ C+: CPT

## 2022-03-21 PROCEDURE — A9698: CPT

## 2022-03-23 ENCOUNTER — APPOINTMENT (OUTPATIENT)
Dept: OTOLARYNGOLOGY | Facility: CLINIC | Age: 67
End: 2022-03-23
Payer: MEDICARE

## 2022-03-23 PROCEDURE — 31579 LARYNGOSCOPY TELESCOPIC: CPT | Mod: GN

## 2022-03-23 PROCEDURE — 92612 ENDOSCOPY SWALLOW (FEES) VID: CPT | Mod: GN

## 2022-03-28 NOTE — ASSESSMENT
[FreeTextEntry1] : \par REPORT OF EVALUATION OF SWALLOW FUNCTION VIA FLEXIBLE ENDOSCOPIC EXAMINATION OF SWALLOWING (FEES) and ASSESSMENT OF VOCAL FUNCTION VIA VIDEOSTROBOSCOPY \par \par History: Information on this patient has been provided by the patient and via chart review. Quang Telles was seen for a Flexible Endoscopic Examination of swallowing to: _x__rule out aspiration; _x__assess for diet texture change as appropriate; and/or __x_ explore positional strategies and/or compensatory techniques to eliminate aspiration. Videostroboscopy assessment was also conducted for a formal assessment of the vocal mechanism.  \par \par Reason For Referral: To determine patient's ability to safely tolerate an oral diet and for formal assessment of the vocal mechanism. The physician ordered these procedures because he wants the patient to meet his nutrition/hydration needs by mouth without compromising respiratory status, and to formally assess the vocal mechanism during phonation. Mr. Telles arrived to today’s assessment reporting a very dry mouth that he attributes to both current medication use and history of smoking (he quit a little over 6 months ago). He also reports intermittent vocal hoarseness which improves after drinking water. In regard to swallow function, the patient stated that food/liquid will occasionally "go down the wrong pipe", with episodes occurring 1-2x weekly. He also describes difficulty "moving air in and out" when lying down, resulting in the need to prop his pillows and use a fan. He reports seeing a pulmonologist ~2 weeks ago, at which time he was told his lungs "looked okay". In addition, Mr. Telles reports "a lot of heartburn", which has recently improved after starting reflux medication. Upon further clinician questioning, the patient denied recent history of pneumonia and unintentional weight loss. \par \par Of note, the patient is known to this department from a recent Modified Barium Swallow study (3/18/22), at which time he was advised to continue a regular solid / thin liquid diet. Impressions stated: "There was no laryngeal penetration/aspiration observed for pureed, solid, mildly thick liquids or thin liquids. 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)\par Deviated septum (470) (J34.2)\par PRYOR (dyspnea on exertion) (786.09) (R06.00)\par Dysphagia, oropharyngeal (787.22) (R13.12)\par Folliculitis (704.8) (L73.9)\par Former smoker (V15.82) (Z87.891)\par Gastro-esophageal reflux disease without esophagitis (530.81) (K21.9)\par Loud snoring (786.09) (R06.83)\par Photodermatitis (692.72) (L56.8)\par Rash (782.1) (R21)\par Voice disturbance (784.40) (R49.9)\par \par Past Medical History\par History of hypertension (V12.59) (Z86.79)\par \par Surgical History\par History of Ankle Surgery\par History of Hip Surgery Right\par \par Family History\par Family history of hearing loss (V19.2) (Z82.2)\par Family history of Heart problem\par \par Social History\par Former smoker (V15.82) (Z87.891)\par \par Current Meds\par Abilify TABS\par Albuterol Sulfate  (90 Base) MCG/ACT Inhalation Aerosol Solution; INHALE 1 TO\par 2 PUFFS EVERY 4 TO 6 HOURS AS NEEDED\par Fluticasone Propionate 0.05 % External Cream; APPLY AND GENTLY MASSAGE INTO\par AFFECTED AREA(S) TWICE DAILY\par Losartan Potassium 50 MG Oral Tablet\par Metoprolol Succinate ER 50 MG Oral Tablet Extended Release 24 Hour\par Pantoprazole Sodium 40 MG Oral Tablet Delayed Release; TAKE 1 TABLET DAILY\par \par Allergies\par Penicillins\par \par Current Respiratory Status: _x_ Normal ___ Tracheostomy Tube  \par \par VIDEOSTROBOSCOPY:  \par The patient was explained the videostroboscopy and FEES procedures, and consent was obtained. A nasendoscope was passed via the right nasal cavity and positioned above the supraglottic structures. Thick  mucous is noted along the glottis and post-cricoid throughout testing, as well as active expectoration from the subglottis. The epiglottis, aryepiglottic folds, arytenoids, and true vocal folds were clearly visible with moderate to severe edema of the arytenoids, false vocal folds, interarytenoid, and post-cricoid. There was also edema of the false vocal folds and TVFs, bilaterally. An anatomical difference marked by a small, round protrusion of light pink color is intermittently noted at the junction of the posterior L TVF and interarytenoid. Assessment of TVF abduction and adduction revealed bilateral motion, however with periods of reduced closure and asymmetric phase symmetry during phonation. There was evidence of hyperfunction with recruitment of the arytenoids and false vocal folds bilaterally. \par \par ASSESSMENT \par Consistencies Administered:  \par Solids: _x__ Regular ___Soft __x_Puree  \par Liquids: _x_ Thin ___ Mildly Thick __Moderately Thick  \par _x_ single cup sips ___ by straw \par \par FEES PROCEDURE \par For the purposes of today’s assessment, the patient was provided with PO trials of puree, regular solid and thin liquid textures. In regard to swallow function, the patient demonstrated an inconsistent mild delay in pharyngeal triggering with the bolus head at the level of the valleculae for puree and solids. There was timely and complete tongue base propulsion/retraction, hyolaryngeal excursion and pharyngeal contractility without any evidence of laryngeal penetration or aspiration noted before or after any PO trial. There was trace diffuse stasis located in the hypopharynx (valleculae, pyriforms, posterior pharyngeal wall, lateral pharyngeal wall) post primary swallow for regular solid textures, however liquid wash was shown to facilitate clearance. There was also evidence of laryngopharyngeal reflux marked by the return of green-tinged material at the post-cricoid region with intermittent migration into the interarytenoid, however adequate airway protection is maintained throughout testing. At this point, the scope was removed and testing was discontinued, with the patient tolerating the procedure without difficulty. \par \par Aspiration - Penetration Scale: 1 - Puree; Regular solid; Thin liquid\par \par Aspiration - Penetration Scale (Rosenbek et al Dysphagia 11:93-98 (April 1996), Aspiration-Penetration Scale) \par 1. Material does not enter the airway \par 2. Material enters the airway, remains above the vocal folds, and is ejected from the airway \par 3. Material enters the airway, remains above the vocal folds, and is not ejected \par 4. Material enters the airway, contacts the vocal folds, and is ejected from the airway \par 5. Material enters the airway, contacts the vocal folds, and is not ejected from the airway \par 6. Material enters the airway, passes below the vocal folds and is ejected into the larynx or out of the airway \par 7. Material enters the airway, passes below the vocal folds, and is not ejected from the trachea despite effort \par 8. Material enters the airway, passes below the vocal folds, and no effort is made to eject \par \par IMPRESSIONS: 1) Dysphonia 2) Pharyngeal Swallow is deemed WFL \par \par PROGNOSIS: Good for recommended diet and functional communication\par \par RECOMMENDATIONS:\par 1) Continue a Regular Solid / Thin Liquid diet\par 2) Safe Feeding Guidelines: Maintain upright position (90 degree angle) during/after meals for 30 minutes; Slow rate of intake; Small bites; Small single cup sips; Allow for swallow between intakes\par 3) Maintain Reflux Precautions\par 4) A course of Voice Therapy (CPT - 83179) is recommended 1x per week, for 6-8 weeks, to maximize vocal function pending MD clearance/treatment plan. Swallow Therapy is not indicated at this time\par 5) Follow up with referring MD \par \par EDUCATION: The aforementioned findings and recommendations were discussed with the patient. Full understanding was demonstrated.\par \par Should you have additional questions/concerns, please contact this department at [phone].\par \par Marla Sommers M.S., CCC-SLP\par Speech-Language Pathologist

## 2022-03-28 NOTE — REASON FOR VISIT
[Initial] : initial visit for [FEES] : Fiberoptic Endoscopic Evaluation of Swallowing [Other: _____] : [unfilled]

## 2022-03-30 ENCOUNTER — APPOINTMENT (OUTPATIENT)
Dept: OTOLARYNGOLOGY | Facility: CLINIC | Age: 67
End: 2022-03-30
Payer: MEDICARE

## 2022-03-30 VITALS
SYSTOLIC BLOOD PRESSURE: 138 MMHG | DIASTOLIC BLOOD PRESSURE: 98 MMHG | WEIGHT: 258 LBS | BODY MASS INDEX: 34.19 KG/M2 | HEIGHT: 73 IN | HEART RATE: 64 BPM

## 2022-03-30 DIAGNOSIS — J98.8 OTHER SPECIFIED RESPIRATORY DISORDERS: ICD-10-CM

## 2022-03-30 DIAGNOSIS — R06.83 SNORING: ICD-10-CM

## 2022-03-30 PROCEDURE — 99214 OFFICE O/P EST MOD 30 MIN: CPT

## 2022-03-30 NOTE — ASSESSMENT
[FreeTextEntry1] : Reviewed and reconciled medications, allergies, PMHx, PSHx, SocHx, FMHx. \par \par h/o reflux, dysphagia, snoring, globus sensation\par \par MBS 3/18/22: no aspiration. bridging osteophytes at level C3, C4, and C5\par \par Videostrobe 3/23/22: Thick mucous is noted along the glottis and post-cricoid throughout testing, as well as active expectoration from the subglottis. The epiglottis, aryepiglottic folds, arytenoids, and true vocal folds with moderate to severe edema of the arytenoids, false vocal folds, interarytenoid, and post-cricoid. There was also edema of the false vocal folds and TVFs, bilaterally. An anatomical difference marked by a small, round protrusion of light pink color is intermittently noted at the junction of the posterior L TVF and interarytenoid. Assessment of TVF abduction and adduction revealed periods of reduced closure and asymmetric phase symmetry during phonation. There was evidence of hyperfunction with recruitment of false cords.\par \par FEEST 3/23/22: inconsistent mild delay in pharyngeal triggering with the bolus head at the level of the valleculae for puree and solids.  trace diffuse stasis located in the hypopharynx (valleculae, pyriforms, posterior pharyngeal wall, lateral pharyngeal wall) post primary swallow for regular solid textures, however liquid wash was shown to facilitate clearance. evidence of LPR.\par \par \par Plan:\par Reviewed MBS, Videostrobe and FEEST results. Sleep study. Continue Pantoprazole- QAM 30 minutes after other medications and 30 minutes before morning meal. Famotidine 20mg- 1 tablet, 30 minutes after other meds and 20 minutes before bed. Continue reflux diet as best as possible. FU after test. Over 50% of the visit spent in discussion.

## 2022-03-30 NOTE — REASON FOR VISIT
[Subsequent Evaluation] : a subsequent evaluation for [FreeTextEntry2] : Patient here to follow up on test results

## 2022-03-30 NOTE — HISTORY OF PRESENT ILLNESS
[de-identified] : The patient presents with h/o reflux, dysphagia, snoring, globus sensation. Pt presents today for his MBS, Videostrobe and FEEST results. Pt states that he has been following the reflux diet as best he can. Pt takes Pantoprazole before bed.

## 2022-03-30 NOTE — CONSULT LETTER
[Dear  ___] : Dear  [unfilled], [Courtesy Letter:] : I had the pleasure of seeing your patient, [unfilled], in my office today. [Please see my note below.] : Please see my note below. [Consult Closing:] : Thank you very much for allowing me to participate in the care of this patient.  If you have any questions, please do not hesitate to contact me. [Sincerely,] : Sincerely, [FreeTextEntry3] : Ricardo Sim MD FACS

## 2022-03-30 NOTE — ADDENDUM
[FreeTextEntry1] : Documented by Bharat Schwartz acting as scribe for Dr. Sim on 03/30/2022.\par \par All Medical record entries made by the Scribe were at my, Dr. Sim, direction and personally dictated by me on 03/30/2022. I have reviewed the chart and agree that the record accurately reflects my personal performance of the history, physical exam, assessment and plan. I have also personally directed, reviewed, and agreed with the discharge instructions.

## 2022-04-09 ENCOUNTER — LABORATORY RESULT (OUTPATIENT)
Age: 67
End: 2022-04-09

## 2022-04-11 ENCOUNTER — APPOINTMENT (OUTPATIENT)
Dept: PULMONOLOGY | Facility: CLINIC | Age: 67
End: 2022-04-11
Payer: MEDICARE

## 2022-04-11 PROCEDURE — 94060 EVALUATION OF WHEEZING: CPT

## 2022-04-11 PROCEDURE — 94727 GAS DIL/WSHOT DETER LNG VOL: CPT

## 2022-04-11 PROCEDURE — 94729 DIFFUSING CAPACITY: CPT

## 2022-04-18 ENCOUNTER — APPOINTMENT (OUTPATIENT)
Dept: OTOLARYNGOLOGY | Facility: CLINIC | Age: 67
End: 2022-04-18

## 2022-04-21 ENCOUNTER — APPOINTMENT (OUTPATIENT)
Dept: OTOLARYNGOLOGY | Facility: CLINIC | Age: 67
End: 2022-04-21
Payer: MEDICARE

## 2022-04-21 PROCEDURE — 92507 TX SP LANG VOICE COMM INDIV: CPT | Mod: GN

## 2022-04-27 ENCOUNTER — APPOINTMENT (OUTPATIENT)
Dept: OTOLARYNGOLOGY | Facility: CLINIC | Age: 67
End: 2022-04-27
Payer: MEDICARE

## 2022-04-27 ENCOUNTER — RX RENEWAL (OUTPATIENT)
Age: 67
End: 2022-04-27

## 2022-04-27 PROCEDURE — 92507 TX SP LANG VOICE COMM INDIV: CPT | Mod: GN

## 2022-05-04 ENCOUNTER — APPOINTMENT (OUTPATIENT)
Dept: OTOLARYNGOLOGY | Facility: CLINIC | Age: 67
End: 2022-05-04
Payer: MEDICARE

## 2022-05-04 PROCEDURE — 92507 TX SP LANG VOICE COMM INDIV: CPT | Mod: GN

## 2022-05-11 ENCOUNTER — APPOINTMENT (OUTPATIENT)
Dept: OTOLARYNGOLOGY | Facility: CLINIC | Age: 67
End: 2022-05-11
Payer: MEDICARE

## 2022-05-11 PROCEDURE — 92507 TX SP LANG VOICE COMM INDIV: CPT | Mod: GN

## 2022-05-17 ENCOUNTER — NON-APPOINTMENT (OUTPATIENT)
Age: 67
End: 2022-05-17

## 2022-05-18 ENCOUNTER — APPOINTMENT (OUTPATIENT)
Dept: OTOLARYNGOLOGY | Facility: CLINIC | Age: 67
End: 2022-05-18

## 2022-05-23 ENCOUNTER — APPOINTMENT (OUTPATIENT)
Dept: PULMONOLOGY | Facility: CLINIC | Age: 67
End: 2022-05-23
Payer: MEDICARE

## 2022-05-23 VITALS
HEART RATE: 69 BPM | BODY MASS INDEX: 34.06 KG/M2 | SYSTOLIC BLOOD PRESSURE: 111 MMHG | DIASTOLIC BLOOD PRESSURE: 78 MMHG | OXYGEN SATURATION: 93 % | TEMPERATURE: 97.7 F | HEIGHT: 73 IN | WEIGHT: 257 LBS

## 2022-05-23 DIAGNOSIS — Z23 ENCOUNTER FOR IMMUNIZATION: ICD-10-CM

## 2022-05-23 PROCEDURE — 90677 PCV20 VACCINE IM: CPT

## 2022-05-23 PROCEDURE — 90471 IMMUNIZATION ADMIN: CPT

## 2022-05-23 PROCEDURE — 99215 OFFICE O/P EST HI 40 MIN: CPT | Mod: 25

## 2022-05-23 RX ORDER — UMECLIDINIUM 62.5 UG/1
62.5 AEROSOL, POWDER ORAL DAILY
Qty: 1 | Refills: 3 | Status: DISCONTINUED | COMMUNITY
Start: 2022-04-13 | End: 2022-05-23

## 2022-05-23 NOTE — PHYSICAL EXAM
[No Acute Distress] : no acute distress [Normal Oropharynx] : normal oropharynx [Normal Appearance] : normal appearance [No Neck Mass] : no neck mass [Normal Rate/Rhythm] : normal rate/rhythm [Normal S1, S2] : normal s1, s2 [No Murmurs] : no murmurs [No Resp Distress] : no resp distress [Clear to Auscultation Bilaterally] : clear to auscultation bilaterally [No Abnormalities] : no abnormalities [Benign] : benign [Normal Gait] : normal gait [No Clubbing] : no clubbing [No Cyanosis] : no cyanosis [FROM] : FROM [Normal Color/ Pigmentation] : normal color/ pigmentation [No Focal Deficits] : no focal deficits [Oriented x3] : oriented x3 [Normal Affect] : normal affect [1+ Pitting] : 1+ pitting

## 2022-05-23 NOTE — HISTORY OF PRESENT ILLNESS
[Former] : former [Never] : never [TextBox_4] : Mr. CRISTAL BERGMAN is a 67 year old former smoker (>30 pack year hx, quit 10/2021) with Schizophrenia, COPD here for f/u.\par \par Has been following w ENT and speech. On PPI and Famotidine for reflux.\par \par Was started on Incruse. Says it helps. Using Albuterol about 2 times/day.\par \par PFTs 4/2022 - mod obstruction, normal volumes, normal DLCO.\par CT chest 10/2021 ([facility]) - RLL scarring/atelectasis, stable aortic aneurism, no concerning nodules\par \par PMH: rib trauma, hip replacement, Schitzophrenia, HTN\par Meds: Losartan, Abilify, Metoprolol\par All: Penicillin\par SH: former smoker, quit 2021, 33 pack year hx; works as  man\par FH: dad - Afib\par PMD: NIRAV CLEMENT\par Immunizations: covid vaccinated and boosted. did not have flu shot\par  [TextBox_11] : 1 [TextBox_13] : 33 [YearQuit] : 10/2021

## 2022-05-23 NOTE — CONSULT LETTER
[Dear  ___] : Dear  [unfilled], [Consult Letter:] : I had the pleasure of evaluating your patient, [unfilled]. [Please see my note below.] : Please see my note below. [Consult Closing:] : Thank you very much for allowing me to participate in the care of this patient.  If you have any questions, please do not hesitate to contact me. [FreeTextEntry3] : Sincerely,\par \par Gayla Bedolla MD\par [facility] Physician [facility]\par Pulmonary Medicine\par tel: [phone]\par fax: [phone]\par

## 2022-05-25 ENCOUNTER — APPOINTMENT (OUTPATIENT)
Dept: OTOLARYNGOLOGY | Facility: CLINIC | Age: 67
End: 2022-05-25
Payer: MEDICARE

## 2022-05-25 PROCEDURE — 92507 TX SP LANG VOICE COMM INDIV: CPT | Mod: GN

## 2022-06-02 NOTE — DISCHARGE NOTE NURSING/CASE MANAGEMENT/SOCIAL WORK - NSDCPNINST_GEN_ALL_CORE
Take your medications exactly as prescribed. Having your pain under control will help increase activity, improve deep breathing and coughing and prevent complications like pneumonia and blood clots in your legs. Some of the common side effects of pain medications are nausea, vomiting, itching, rash and upset stomach. Contact your doctor if you develop these or any other unusual systoms. Eat a diet rich in fiber and drink plenty of oral fluids. Use other pain management methods like, cold and warm applications, elevation of affected body part, listening to music, watching TV, yoga, etc.Do not take any other medications unless approved by your doctor. Do not drive, operate machinery, drink alcohol, or make any important decisions while taking narcotic pain medications. Store medication in its original bottle, in a locked cabinet away from the reach of children. Dispose of any unused and  medication safely.
Yes

## 2022-06-06 ENCOUNTER — APPOINTMENT (OUTPATIENT)
Dept: OTOLARYNGOLOGY | Facility: CLINIC | Age: 67
End: 2022-06-06
Payer: MEDICARE

## 2022-06-06 PROCEDURE — 92507 TX SP LANG VOICE COMM INDIV: CPT | Mod: GN

## 2022-06-07 ENCOUNTER — APPOINTMENT (OUTPATIENT)
Dept: SLEEP CENTER | Facility: CLINIC | Age: 67
End: 2022-06-07
Payer: MEDICARE

## 2022-06-07 ENCOUNTER — OUTPATIENT (OUTPATIENT)
Dept: OUTPATIENT SERVICES | Facility: HOSPITAL | Age: 67
LOS: 1 days | End: 2022-06-07
Payer: COMMERCIAL

## 2022-06-07 DIAGNOSIS — Z98.890 OTHER SPECIFIED POSTPROCEDURAL STATES: Chronic | ICD-10-CM

## 2022-06-07 DIAGNOSIS — S82.899A OTHER FRACTURE OF UNSPECIFIED LOWER LEG, INITIAL ENCOUNTER FOR CLOSED FRACTURE: Chronic | ICD-10-CM

## 2022-06-07 PROCEDURE — 95810 POLYSOM 6/> YRS 4/> PARAM: CPT

## 2022-06-07 PROCEDURE — 95810 POLYSOM 6/> YRS 4/> PARAM: CPT | Mod: 26

## 2022-06-15 ENCOUNTER — APPOINTMENT (OUTPATIENT)
Dept: OTOLARYNGOLOGY | Facility: CLINIC | Age: 67
End: 2022-06-15
Payer: MEDICARE

## 2022-06-15 VITALS
SYSTOLIC BLOOD PRESSURE: 115 MMHG | HEIGHT: 73 IN | DIASTOLIC BLOOD PRESSURE: 83 MMHG | BODY MASS INDEX: 33.8 KG/M2 | HEART RATE: 80 BPM | WEIGHT: 255 LBS

## 2022-06-15 PROCEDURE — 99214 OFFICE O/P EST MOD 30 MIN: CPT

## 2022-06-15 NOTE — ADDENDUM
[FreeTextEntry1] : Documented by Carmen Coates acting as scribe for Dr. Sim on 06/15/2022. \par \par All Medical record entries made by the scribe were at my. Dr. Sim direction and personally dictated by me on 06/15/2022. I have reviewed the chart and agree that the record accurately reflects my personal performance of the history, physical exam, assessment and plan. I have also personally directed, reviewed, and agreed with the discharge instructions.

## 2022-06-15 NOTE — HISTORY OF PRESENT ILLNESS
[de-identified] : The patient presents with h/o reflux, dysphagia, snoring, globus sensation. The patient presents today to discuss sleep study results. Pt reports speech therapy has been going okay. Pt states he couldn’t make it through the sleep study since he couldn’t tolerate it.

## 2022-06-15 NOTE — PHYSICAL EXAM
[Normal] : mucosa is normal [Midline] : trachea is located in midline position [Clear / Open well] : hypopharynx is clear and opens well [FreeTextEntry1] : uvula and soft palate are sitting at BOT\par tonsils are class 3 and lateral \par type 3 oral cavity

## 2022-06-15 NOTE — ASSESSMENT
[FreeTextEntry1] : Reviewed and reconciled medications, allergies, PMHx, PSHx, SocHx, FMHx. \par \par h/o reflux, dysphagia, snoring, globus sensation.\par The patient presents today to discuss sleep study results\par \par videostrobe 03/23/2022 - \par polypoid lesion portion left vc and in the arytenoid \par some hyperfunction \par \par sleep study 06/07/2022 - \par he slept at 9pm and gave up at 2 am \par never got to sleep at all \par stage 1 only \par during stage 1 he had 9 episodes of sleep apnea \par oxygen got down to 86%\par based on these results pt definitely has sleep apnea but cant tell the severity of it since pt didn’t sleep all night \par \par Plan:\par sleep study results discussed. prior videostrobe results discussed. AutoPAP machine ordered. Discussed r/b/a of removal of growth in the throat. FU after getting report from speech therapist.

## 2022-06-24 ENCOUNTER — APPOINTMENT (OUTPATIENT)
Dept: OTOLARYNGOLOGY | Facility: CLINIC | Age: 67
End: 2022-06-24
Payer: MEDICARE

## 2022-06-24 VITALS
HEIGHT: 73 IN | SYSTOLIC BLOOD PRESSURE: 122 MMHG | BODY MASS INDEX: 33.13 KG/M2 | WEIGHT: 250 LBS | HEART RATE: 73 BPM | DIASTOLIC BLOOD PRESSURE: 81 MMHG

## 2022-06-24 PROCEDURE — 99213 OFFICE O/P EST LOW 20 MIN: CPT | Mod: 25

## 2022-06-24 PROCEDURE — 31575 DIAGNOSTIC LARYNGOSCOPY: CPT

## 2022-06-24 RX ORDER — LOSARTAN POTASSIUM 50 MG/1
50 TABLET, FILM COATED ORAL
Refills: 0 | Status: DISCONTINUED | COMMUNITY
End: 2022-06-24

## 2022-06-24 NOTE — PHYSICAL EXAM
[Hearing Sage Test (Tuning Fork On Forehead)] : no lateralization of tone [] : septum deviated bilaterally [Normal] : mucosa is normal [Midline] : trachea located in midline position [Leukoplakia] : leukoplakia [de-identified] : TMJ snaps when he opens his mouth  [FreeTextEntry8] : cerumen removed via curettage - minimal on posterior canal wall  [de-identified] : large floppy uvula sitting on BOT

## 2022-06-24 NOTE — HISTORY OF PRESENT ILLNESS
[de-identified] : The patient presents with h/o reflux, dysphagia, snoring, globus sensation, sleep apnea. The patient presents today for a follow up on his throat. Pt reports feeling the same as last visit and is still having dysphagia sometimes. Pt denies getting his AutoPAP machine yet which was ordered last visit. Pt states taking all his reflux meds and following the diet. Pt reports he started smoking few weeks ago because of the stress.

## 2022-06-24 NOTE — ADDENDUM
[FreeTextEntry1] : Documented by Carmen Coates acting as scribe for Dr. Sim on 06/24/2022. \par \par All Medical record entries made by the scribe were at my. Dr. Sim direction and personally dictated by me on 06/24/2022. I have reviewed the chart and agree that the record accurately reflects my personal performance of the history, physical exam, assessment and plan. I have also personally directed, reviewed, and agreed with the discharge instructions.

## 2022-06-24 NOTE — PROCEDURE
[Unable to Cooperate with Mirror] : patient unable to cooperate with mirror [Dysphagia] : dysphagia not clearly evaluated by indirect laryngoscopy [None] : none [de-identified] : Procedure: Flexible Fiberoptic Laryngoscopy: Risks, benefits, and alternatives of flexible endoscopy were explained to the patient. The patient gave oral consent to proceed. The flexible scope was inserted into the right nasal cavity and advanced towards the nasopharynx. Visualized mucosa over the turbinates and septum were as described above. The nasopharynx was clear. Oropharyngeal walls were symmetric and mobile without lesion, mass, or edema. narrowing at the level of soft palate, very narrow airway. Hypopharynx was also without lesion or edema. Larynx was mobile without lesions. significant edema postcricoid interarytenoid and arytenoid. True vocal folds were white without mass or lesion. Base of tongue was within normal limits.

## 2022-06-24 NOTE — ASSESSMENT
[FreeTextEntry1] : Reviewed and reconciled medications, allergies, PMHx, PSHx, SocHx, FMHx. \par \par h/o reflux, dysphagia, snoring, globus sensation, sleep apnea\par \par Physical exam - \par right ear - cerumen removed - minimal on posterior canal wall \par deviated septum b/l \par large floppy uvula sitting on BOT \par TMJ snaps when he opens his mouth \par \par Flexible laryngoscopy -\par narrowing at the level of soft palate \par very narrow airway \par significant edema postcricoid interarytenoid and arytenoid \par unable to see all the swelling present since pt has been smoking - videostrobe required \par \par Plan:\par cerumen removed. Flexible laryngoscopy. another videostrobe ordered. need to stop smoking. continue reflux diet and meds. FU after videostrobe

## 2022-07-04 DIAGNOSIS — G47.33 OBSTRUCTIVE SLEEP APNEA (ADULT) (PEDIATRIC): ICD-10-CM

## 2022-07-12 ENCOUNTER — APPOINTMENT (OUTPATIENT)
Dept: OTOLARYNGOLOGY | Facility: CLINIC | Age: 67
End: 2022-07-12

## 2022-07-12 PROCEDURE — 92612 ENDOSCOPY SWALLOW (FEES) VID: CPT | Mod: GN

## 2022-07-15 ENCOUNTER — APPOINTMENT (OUTPATIENT)
Dept: OTOLARYNGOLOGY | Facility: CLINIC | Age: 67
End: 2022-07-15

## 2022-07-15 VITALS
DIASTOLIC BLOOD PRESSURE: 74 MMHG | HEIGHT: 73 IN | BODY MASS INDEX: 33.13 KG/M2 | WEIGHT: 250 LBS | HEART RATE: 69 BPM | SYSTOLIC BLOOD PRESSURE: 108 MMHG

## 2022-07-15 PROCEDURE — 99214 OFFICE O/P EST MOD 30 MIN: CPT

## 2022-07-15 RX ORDER — ARIPIPRAZOLE 30 MG/1
30 TABLET ORAL
Qty: 30 | Refills: 0 | Status: DISCONTINUED | COMMUNITY
Start: 2022-04-26 | End: 2022-07-15

## 2022-07-15 NOTE — HISTORY OF PRESENT ILLNESS
[de-identified] : The patient presents with h/o reflux, dysphagia, snoring, globus sensation, sleep apnea. The patient presents today to discuss videostrobe results. Pt reports to be still smoking. Pt states taking pantroprazole and famotidine for reflux and he has been following the reflux diet.

## 2022-07-15 NOTE — ADDENDUM
[FreeTextEntry1] : Documented by Carmen Coates acting as scribe for Dr. Sim on 07/15/2022. \par \par All Medical record entries made by the scribe were at my. Dr. Sim direction and personally dictated by me on 07/15/2022. I have reviewed the chart and agree that the record accurately reflects my personal performance of the history, physical exam, assessment and plan. I have also personally directed, reviewed, and agreed with the discharge instructions.

## 2022-07-15 NOTE — ASSESSMENT
[FreeTextEntry1] : Speech/ Language/ Voice Evaluation and REPORT OF EVALUATION OF VOCAL FUNCTION VIA VIDEOSTROBOSCOPY AND BEHAVIORAL/PERCEPTUAL ANALYSIS:\par \par History: Information on this patient has been provided by the patient and via chart review. Quang Telles is a 67 year-old male who was seen for a videostroboscopy and behavioral voice evaluation to formally assess vocal function. He was alert and cooperative for the entirety of today’s assessment.\par \par Reason For Referral: To formally assess vocal function. According to patient and medical chart review, Mr. Del Rio is a 67 year-old male with history of h/o reflux, COPD, dysphagia, dysphonia, and obstructive sleep apnea. He is well known to this service from previous FEES and videostroboscopy (3/23/22), and recent course of voice therapy (April to June 2022). Patient was discharged when judged to have met maximum potential with skilled ST services (please see chart for all SLP documentation). Patient arrives to today's evaluation reporting dry mouth, increased mucous and throat clearing. He reports leaving facility group home through sleep study. He has yet to receive his autoPAP. Patient also reports that he started smoking again a few weeks ago. He denies any changes in vocal quality since last seen for voice therapy. \par \par Medical History, as per charting: \par Active Problems\par Airway obstruction (519.8) (J98.8)\par Cicatrix (709.2) (L90.5)\par COPD, moderate (496) (J44.9)\par Deviated septum (470) (J34.2)\par PRYOR (dyspnea on exertion) (786.09) (R06.00)\par Dysphagia, oropharyngeal (787.22) (R13.12)\par Encounter for immunization (V03.89) (Z23)\par Folliculitis (704.8) (L73.9)\par Former smoker (V15.82) (Z87.891)\par Gastro-esophageal reflux disease without esophagitis (530.81) (K21.9)\par Hypoxia (799.02) (R09.02)\par Loud snoring (786.09) (R06.83)\par Obstructive sleep apnea (327.23) (G47.33)\par Photodermatitis (692.72) (L56.8)\par 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) (J38.1)\par Rash (782.1) (R21)\par Voice disturbance (784.40) (R49.9)\par \par Past Medical History\par History of hypertension (V12.59) (Z86.79)\par \par Current Meds\par Abilify TABS\par Albuterol Sulfate  (90 Base) MCG/ACT Inhalation Aerosol Solution; INHALE 1 TO\par 2 PUFFS EVERY 4 TO 6 HOURS AS NEEDED\par amLODIPine Besylate 5 MG Oral Tablet; TAKE 1 TABLET BY MOUTH EVERY DAY\par Anoro Ellipta 62.5-25 MCG/INH Inhalation Aerosol Powder Breath Activated; 1 puff\par inhaled, daily\par ARIPiprazole 30 MG Oral Tablet; TAKE 1 TABLET BY MOUTH EVERY EVENING\par Famotidine 20 MG Oral Tablet; TAKE 1 TABLET AT BEDTIME\par Metoprolol Succinate ER 50 MG Oral Tablet Extended Release 24 Hour\par Pantoprazole Sodium 40 MG Oral Tablet Delayed Release; TAKE 1 TABLET BY MOUTH\par EVERY DAY\par \par Allergies\par Penicillins\par \par Current Respiratory Status: _x_ Normal ___ Tracheostomy Tube\par \par VIDEOSTROBOSCOPY:\par A nasendoscope was passed via the right nasal cavity and positioned above the supraglottic structures. Posterior pharyngeal wall bulging is visualized. There is a thick, white-tavo / yellow-tavo film along the base of tongue, consistent with possible thrush. The epiglottis, aryepiglottic folds, arytenoids, and true vocal folds were clearly visible with thick, white mucous noted diffusely throughout the hypopharynx. Severe diffuse edema of supraglottic structures, particularly the arytenoids, interarytenoid, postcricoid and false vocal cords. In addition, there is an anatomical change marked by a small, round prominence at the junction of the posterior left false vocal cord and interarytenoid, consistent with previous videostroboscopy. Ability to view the TVFs during inspiration/expiration was significantly limited due to the severity of false vocal fold edema. Rough medial edges were noted at times. In addition, there was intermittent visualization of a patchy white substance along the true vocal folds (?mucous versus anatomical change). Unable to adequately assess TVF mobility during abduction and adduction due to the severity of hyperfunction with frequent episodes of ventricular phonation via lateral compression/vibration of the false vocal cords. At this point, testing was discontinued and the scope carefully removed, with the patient tolerating the procedure without difficulty.\par \par BEHAVIORAL VOICE EVALUATION:\par Perceptually, Mr. Telles presented with a moderate dysphonia marked by a hoarse and strained vocal quality accompanied by restricted pitch range, back tone focus and use of hard glottal attack. In addition, the patient demonstrated reduced coordination of respiratory/phonatory processes and increased musculoskeletal tension in the neck and shoulders. Maximum phonation time was significantly reduced as compared to his age matched peers. Resonance and vocal volume were deemed relatively WFL.\par \par RECOMMENDATIONS:\par 1)  Follow up with referring MD for treatment plan given above findings. Consider continuation of voice therapy (CPT - 99552) versus alternative treatment options. \par 2) Maintain good vocal hygiene (i.e., increase hydration, implement vocal rest, avoid vocal misuse/overuse, cessation of smoking, etc.), as reviewed at the time of today's evaluation. \par \par EDUCATION: The above recommendations were discussed with the patient. \par \par Should you have additional questions/concerns, please contact this department at [phone].\par \par Marla Sommers M.S., CCC-Sl \par \par

## 2022-07-15 NOTE — ASSESSMENT
[FreeTextEntry1] : Reviewed and reconciled medications, allergies, PMHx, PSHx, SocHx, FMHx. \par \par h/o reflux, dysphagia, snoring, globus sensation, sleep apnea.\par The patient presents today for videostrobe \par \par videostrobe 07/12/2022 - \par lot of swelling \par small polyp at the back of left vc\par couldn’t see the true vc because of the swelling \par some patchy white areas which they couldn’t tell if it was mucus or not \par voice straining \par recommendation continue speech therapy, drink lots of water, stop smoking \par \par Plan:\par videostrobe results discussed. Discussed r/b/a of direct laryngoscopy with biopsy of the white area. stop smoking. continue speech therapy. more then 50% of the time spent consulting. FU 1 month

## 2022-07-17 ENCOUNTER — NON-APPOINTMENT (OUTPATIENT)
Age: 67
End: 2022-07-17

## 2022-08-04 ENCOUNTER — APPOINTMENT (OUTPATIENT)
Dept: OTOLARYNGOLOGY | Facility: CLINIC | Age: 67
End: 2022-08-04

## 2022-08-04 PROCEDURE — 92507 TX SP LANG VOICE COMM INDIV: CPT | Mod: GN

## 2022-08-18 ENCOUNTER — APPOINTMENT (OUTPATIENT)
Dept: OTOLARYNGOLOGY | Facility: CLINIC | Age: 67
End: 2022-08-18

## 2022-08-18 PROCEDURE — 92507 TX SP LANG VOICE COMM INDIV: CPT | Mod: GN

## 2022-08-19 ENCOUNTER — APPOINTMENT (OUTPATIENT)
Dept: OTOLARYNGOLOGY | Facility: CLINIC | Age: 67
End: 2022-08-19

## 2022-08-19 VITALS
HEIGHT: 73 IN | BODY MASS INDEX: 33.13 KG/M2 | DIASTOLIC BLOOD PRESSURE: 85 MMHG | SYSTOLIC BLOOD PRESSURE: 133 MMHG | HEART RATE: 77 BPM | WEIGHT: 250 LBS

## 2022-08-19 PROCEDURE — 99214 OFFICE O/P EST MOD 30 MIN: CPT | Mod: 25

## 2022-08-19 PROCEDURE — 31575 DIAGNOSTIC LARYNGOSCOPY: CPT

## 2022-08-19 NOTE — PHYSICAL EXAM
[Normal] : nasopharynx is normal [de-identified] : good motion of the neck  [FreeTextEntry8] : cerumen removed via curettage - minimal [FreeTextEntry9] : cerumen removed via curettage - minimal [FreeTextEntry1] : deviated septum to the left and midportion along the floor to the right  [de-identified] : white coating and edema of uvula, crowded airway

## 2022-08-19 NOTE — ADDENDUM
[FreeTextEntry1] : Documented by aCrmen Coates acting as scribe for Dr. Sim on 08/19/2022. \par \par All Medical record entries made by the scribe were at my. Dr. Sim direction and personally dictated by me on 08/19/2022. I have reviewed the chart and agree that the record accurately reflects my personal performance of the history, physical exam, assessment and plan. I have also personally directed, reviewed, and agreed with the discharge instructions.  Helical Rim Text: The closure involved the helical rim.

## 2022-08-19 NOTE — HISTORY OF PRESENT ILLNESS
[de-identified] : The patient presents with h/o reflux, dysphagia, snoring, globus sensation. The patient presents today for 1 month follow up on vc white area. Pt reports to be still smoking. Pt states to be taking pantoprazole and famotidine and following the reflux diet.

## 2022-08-19 NOTE — ASSESSMENT
[FreeTextEntry1] : Reviewed and reconciled medications, allergies, PMHx, PSHx, SocHx, FMHx. \par \par h/o reflux, dysphagia, snoring, globus sensation. \par The patient presents today for 1 month follow up on vc white area\par \par speech therapy note - has reached the maximum improvement because of his smoking \par \par Physical exam - \par good motion of the neck \par deviated septum to the left and midportion along the floor to the right \par white coating and edema of uvula, crowded airway\par cerumen removed via curettage - minimal b/l \par \par Flexible laryngoscopy - \par nasopharynx normal\par narrowing level of soft plate but no obstruction\par left sided false cord lesion \par white changes on true cord \par generalized edema from smoking - reinkie's edema \par very difficult exam - still swollen and edematous \par \par Plan:\par cerumen removed b/l. Discussed r/b/a of removal and biopsy of white lesion on vc - possibility of it coming back since pt is still smoking. Flexible laryngoscopy. speech therapy note discussed. stop smoking. FU after procedure

## 2022-08-19 NOTE — PROCEDURE
[Unable to Cooperate with Mirror] : patient unable to cooperate with mirror [Gag Reflex] : gag reflex preventing mirror examination [None] : none [de-identified] : Procedure: Flexible Fiberoptic Laryngoscopy: Risks, benefits, and alternatives of flexible endoscopy were explained to the patient. The patient gave oral consent to proceed. The flexible scope was inserted into the right nasal cavity and advanced towards the nasopharynx. Visualized mucosa over the turbinates and septum were as described above. The nasopharynx was clear. narrowing level of soft plate but no obstruction. generalized edema from smoking - reinkie's edema. very difficult exam - still swollen and edematous. left sided false cord lesion, white changes on true cord  [de-identified] : white area on vc

## 2022-08-22 ENCOUNTER — APPOINTMENT (OUTPATIENT)
Dept: PULMONOLOGY | Facility: CLINIC | Age: 67
End: 2022-08-22

## 2022-08-22 VITALS
WEIGHT: 258 LBS | OXYGEN SATURATION: 91 % | DIASTOLIC BLOOD PRESSURE: 83 MMHG | TEMPERATURE: 97.8 F | HEART RATE: 67 BPM | BODY MASS INDEX: 34.19 KG/M2 | SYSTOLIC BLOOD PRESSURE: 120 MMHG | RESPIRATION RATE: 18 BRPM | HEIGHT: 73 IN

## 2022-08-22 PROCEDURE — 99215 OFFICE O/P EST HI 40 MIN: CPT | Mod: 25

## 2022-08-22 PROCEDURE — G0296 VISIT TO DETERM LDCT ELIG: CPT

## 2022-08-22 PROCEDURE — 99407 BEHAV CHNG SMOKING > 10 MIN: CPT

## 2022-08-22 PROCEDURE — 94618 PULMONARY STRESS TESTING: CPT

## 2022-08-22 NOTE — CONSULT LETTER
[Dear  ___] : Dear  [unfilled], [Consult Letter:] : I had the pleasure of evaluating your patient, [unfilled]. [Please see my note below.] : Please see my note below. [Consult Closing:] : Thank you very much for allowing me to participate in the care of this patient.  If you have any questions, please do not hesitate to contact me. [FreeTextEntry3] : Sincerely,\par \par Gayla Bedolla MD\par [facility] Physician [facility]\par Pulmonary Medicine\par tel: [phone]\par fax: [phone]\par

## 2022-08-22 NOTE — COUNSELING
[Cessation strategies including cessation program discussed] : Cessation strategies including cessation program discussed [Use of nicotine replacement therapies and other medications discussed] : Use of nicotine replacement therapies and other medications discussed [Encouraged to pick a quit date and identify support needed to quit] : Encouraged to pick a quit date and identify support needed to quit [No] : Not willing to quit smoking [ - Annual Lung Cancer Screening/Share Decision Making Discussion] : Annual Lung Cancer Screening/Share Decision Making Discussion. (I have advised this patient to have a Low Dose CT (LDCT) scan of the lungs and have discussed the following with the patient in a shared decision making discussion:   Benefits of Detection and Early Treatment: There is adequate evidence that annual screening for lung cancer with LDCT in a population of high-risk persons can prevent a substantial number of lung cancer–related deaths. The magnitude of benefit depends on the individual patient's risk for lung cancer, as those who are at highest risk are most likely to benefit. Screening cannot prevent most lung cancer–related deaths, and does not replace smoking cessation. Harms of Detection and Early Intervention and Treatment: The harms associated with LDCT screening include false-negative and false-positive results, incidental findings, over diagnosis, and radiation exposure. False-positive LDCT results occur in a substantial proportion of screened persons; 95% of all positive results do not lead to a diagnosis of cancer. In a high-quality screening program, further imaging can resolve most false-positive results; however, some patients may require invasive procedures. Radiation harms, including cancer resulting from cumulative exposure to radiation, vary depending on the age at the start of screening; the number of scans received; and the person's exposure to other sources of radiation, particularly other medical imaging.) [FreeTextEntry3] : 10

## 2022-08-22 NOTE — PHYSICAL EXAM
[No Acute Distress] : no acute distress [Normal Oropharynx] : normal oropharynx [Normal Appearance] : normal appearance [No Neck Mass] : no neck mass [Normal Rate/Rhythm] : normal rate/rhythm [Normal S1, S2] : normal s1, s2 [No Murmurs] : no murmurs [No Resp Distress] : no resp distress [No Abnormalities] : no abnormalities [Benign] : benign [Normal Gait] : normal gait [No Clubbing] : no clubbing [No Cyanosis] : no cyanosis [FROM] : FROM [1+ Pitting] : 1+ pitting [Normal Color/ Pigmentation] : normal color/ pigmentation [No Focal Deficits] : no focal deficits [Oriented x3] : oriented x3 [Normal Affect] : normal affect [TextBox_68] : decreased BS at the bases

## 2022-08-22 NOTE — HISTORY OF PRESENT ILLNESS
[Former] : former [Never] : never [TextBox_4] : Mr. CRISTAL BERGMAN is a 67 year old smoker (>30 pack year hx, quit 10/2021) with Schizophrenia, COPD here for f/u.\par \par Has been following w ENT and speech. On PPI and Famotidine for reflux. Planned for biopsy for VC lesion\par \par Started smoking again. \par Breathing is "fair". Walks a couple of miles. Walks slowly when walking up the stairs. \par Compliant with Anoro. Ran out of Albuterol. \par \par Did not get aPAP machine - patient has to pay for part of it. Not ready at this time. \par No UC/ER visits. 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 did not have flu shot\par  [TextBox_11] : 1 [TextBox_13] : 33 [YearQuit] : 10/2021

## 2022-08-22 NOTE — ASSESSMENT
[FreeTextEntry1] : Mr. CRISTAL BERGMAN is a 66 year old former smoker (>30 pack year hx, quit 10/2021) w Schizophrenia, COPD, NIKIA  here for f/u. \par \par #COPD - mod, Gold Gr A\par -- c/w Anoro. Albuterol prn\par - Exercise oximetry on RA today: at rest  - P72: O2 90%; with exertion - P73: O2: 94 %\par O2 sats improved from 90% to 95% with deep breathing indicating component of hypoventilation.\par \par \par #Smoker- unfortunately started smoking again. Encouraged quitting. Support provided\par -- LDCT 10/2021, repeat ordered\par \par #Snoring - patient with obstructive and central apneas on PSG but short total sleep time. AHI of 112 may be artificially increased.\par -- need to repeat sleep study\par \par #Dilated ascending aortia - noted on CT, stable\par -- still needs cardiology appointment, patient aware\par \par #HCM - Prevnar 20 today; COVID vaccinated, boosted\par \par All questions answered. Patient in agreement with plan. \par Follow up in 4-5mo  or sooner if needed.\par

## 2022-09-06 NOTE — ED PROVIDER NOTE - TIMING
Show Follow-Up Variable: Yes Detail Level: Detailed Detail Level: Simple Detail Level: Zone sudden onset

## 2022-09-07 ENCOUNTER — RX RENEWAL (OUTPATIENT)
Age: 67
End: 2022-09-07

## 2022-09-13 ENCOUNTER — RX RENEWAL (OUTPATIENT)
Age: 67
End: 2022-09-13

## 2022-09-20 ENCOUNTER — RX RENEWAL (OUTPATIENT)
Age: 67
End: 2022-09-20

## 2022-09-20 ENCOUNTER — APPOINTMENT (OUTPATIENT)
Dept: OTOLARYNGOLOGY | Facility: AMBULATORY MEDICAL SERVICES | Age: 67
End: 2022-09-20

## 2022-09-20 ENCOUNTER — RESULT REVIEW (OUTPATIENT)
Age: 67
End: 2022-09-20

## 2022-09-20 PROCEDURE — 31571 LARYNGOSCOP W/VC INJ + SCOPE: CPT

## 2022-09-20 PROCEDURE — 31541 LARYNSCOP W/TUMR EXC + SCOPE: CPT

## 2022-10-03 ENCOUNTER — APPOINTMENT (OUTPATIENT)
Dept: OTOLARYNGOLOGY | Facility: CLINIC | Age: 67
End: 2022-10-03

## 2022-10-03 VITALS
HEIGHT: 72 IN | WEIGHT: 255 LBS | DIASTOLIC BLOOD PRESSURE: 78 MMHG | SYSTOLIC BLOOD PRESSURE: 117 MMHG | HEART RATE: 72 BPM | BODY MASS INDEX: 34.54 KG/M2

## 2022-10-03 PROCEDURE — 31575 DIAGNOSTIC LARYNGOSCOPY: CPT

## 2022-10-03 PROCEDURE — 99213 OFFICE O/P EST LOW 20 MIN: CPT | Mod: 25

## 2022-10-03 NOTE — ASSESSMENT
[FreeTextEntry1] : Reviewed and reconciled medications, allergies, PMHx, PSHx, SocHx, FMHx \par \par Pt. with h/o reflux, dysphagia, snoring, globus sensation presents today for post op visit from excision biopsy of vocal cord lesion 09/20/22. Patient states his voice feels pretty good right now. Patient states he started smoking again about a couple days ago. patient states he has a CPAP machine at home but he is having trouble with his machine and not really using it. \par \par \par Pathology 09/20/22:\par -benign papilloma\par \par Flexible Laryngoscopy:\par -narrowing at level of the soft palate\par -BOT looks normal\par -edema of the cords\par -area of biopsy looks well healed.\par -very narrow airway\par \par Physical Exam:\par - deviated septum b/l more obstructive on left than right.\par -large floppy uvula\par \par Plan: Stop smoking. Get in touch with people who gave you the CPAP to get the machine working. Start speech therapy. FU 3 months.

## 2022-10-03 NOTE — PHYSICAL EXAM
[] : septum deviated bilaterally [Midline] : trachea located in midline position [Normal] : no rashes [de-identified] : more obstructive on left than right  [de-identified] : large floppy uvula

## 2022-10-03 NOTE — CONSULT LETTER
[Dear  ___] : Dear  [unfilled], [Courtesy Letter:] : I had the pleasure of seeing your patient, [unfilled], in my office today. [FreeTextEntry1] : Ricardo Sim MD FACS

## 2022-10-03 NOTE — PROCEDURE
[Complicated Symptoms] : complicated symptoms requiring more thorough examination than provided by mirror [de-identified] : Flexible Laryngoscopy:\par -narrowing at level of the soft palate\par -BOT looks normal\par -edema of the cords\par -area of biopsy looks well healed.\par -very narrow airway

## 2022-10-03 NOTE — HISTORY OF PRESENT ILLNESS
[de-identified] : Pt. with h/o reflux, dysphagia, snoring, globus sensation presents today for post op visit from excision biopsy of vocal cord lesion 09/20/22. Patient states his voice feels pretty good right now. Patient states he started smoking again about a couple days ago. patient states he has a CPAP machine at home but he is having trouble with his machine and not really using it.

## 2022-10-10 ENCOUNTER — APPOINTMENT (OUTPATIENT)
Dept: OTOLARYNGOLOGY | Facility: CLINIC | Age: 67
End: 2022-10-10

## 2022-10-10 PROCEDURE — 92524 BEHAVRAL QUALIT ANALYS VOICE: CPT | Mod: GN

## 2022-10-13 NOTE — ASSESSMENT
[FreeTextEntry1] : VOICE EVALUATION REPORT\par \par Date of Evaluation:10/13/2022\par Patient Name: Quang Telles\par Patient : 1955\par Primary Diagnosis:Left vocal cord Paralysis\par Treatment Diagnosis:Dysphonia\par Procedure Codes: Voice Evaluation - 10644;  Laryngeal Function - 58136\par Referring Physician: Dr. Ricardo Sim\par Primary Voice Complaint: Hoarseness\par \par REASON FOR REFERRAL:\par Quang Telles is a 67 year-old male who was referred for a voice evaluation and management of vocal difficulties by Dr. Ricardo Sim, otolaryngologist. Patient is familiar to this clinician from previous videostroboscopy and voice therapy. He is now s/p excision biopsy of vocal cord lesion 22. \par \par HISTORY OF PRESENT ILLNESS:\par Mr. Telles was alert, pleasant and cooperative upon arrival to today's evaluation. Per charting, medical history is significant for reflux, dysphagia, globus sensation and NIKIA. Patient has CPAP, but not using it. Patient is a current smoker. He has been taking pantoprazole and famotidine for reflux. He claims to be following an anti-reflux diet. Patient is familiar to this department from from previous modified barium swallow study (3/18/22), FEES (3/23/22), videostroboscopy x2 (3/23/22 and 22) and course of voice therapy; please refer to chart for full SLP reports / treatment notes. Progress in therapy has been limited, likely due to current smoking status. Patient is now s/p excision biopsy of vocal cord lesion 22; pathology revealed benign papilloma. He states voice "sounds better" since time of surgery. He denies over-use / misuse of voice.  Patient denies dysphagia and odynophagia at this time. Of note, patient tends to rely on direct clinician questioning in order to provide relevant information regarding overall health and vocal function. \par \par MEDICAL HISTORY, as per charting:\par Active Problems\par Airway obstruction (519.8) (J98.8)\par Cicatrix (709.2) (L90.5)\par COPD, moderate (496) (J44.9)\par Deviated septum (470) (J34.2)\par PRYOR (dyspnea on exertion) (786.09) (R06.00)\par Dysphagia, oropharyngeal (787.22) (R13.12)\par Encounter for immunization (V03.89) (Z23)\par Folliculitis (704.8) (L73.9)\par Former smoker (V15.82) (Z87.891)\par Gastro-esophageal reflux disease without esophagitis (530.81) (K21.9)\par Hypoxia (799.02) (R09.02)\par Loud snoring (786.09) (R06.83)\par Obstructive sleep apnea (327.23) (G47.33)\par Photodermatitis (692.72) (L56.8)\par 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) (J38.1)\par Rash (782.1) (R21)\par Voice disturbance (784.40) (R49.9)\par \par Past Medical History\par History of hypertension (V12.59) (Z86.79)\par \par Current Meds\par Abilify TABS\par Albuterol Sulfate  (90 Base) MCG/ACT Inhalation Aerosol Solution; INHALE 1 TO\par 2 PUFFS EVERY 4 TO 6 HOURS AS NEEDED\par amLODIPine Besylate 5 MG Oral Tablet; TAKE 1 TABLET BY MOUTH EVERY DAY\par Anoro Ellipta 62.5-25 MCG/INH Inhalation Aerosol Powder Breath Activated; 1 puff\par inhaled, daily\par Famotidine 20 MG Oral Tablet; TAKE 1 TABLET AT BEDTIME\par Metoprolol Succinate ER 50 MG Oral Tablet Extended Release 24 Hour\par Pantoprazole Sodium 40 MG Oral Tablet Delayed Release; TAKE 1 TABLET BY MOUTH\par EVERY DAY\par \par Allergies\par Penicillins\par \par CLINICAL FINDINGS\par Perceptual Voice Analysis\par Vocal Quality:  Hoarse; Strained	\par Severity:  Mild to Moderate\par Loudness Level:  WNL\par Pitch:  Low\par Musculoskeletal Tension:  Present\par Location:  Neck; Shoulders\par Respiration: Upper thoracic\par Resonance: WNL\par Tone Focus:  Back\par Type of Onset:  Hard Glottal Attack\par Laryngeal Palpation: WNL; patient denies pain\par \par Interpretation:\par Perceptually, Mr. Telles presents with a mild to moderate dysphonia marked by a hoarse and strained vocal quality accompanied by restricted pitch range, back tone focus and use of hard glottal attack. In addition, the patient demonstrates increased perilaryngeal muscle tension and reduced coordination of respiratory-phonatory processes. Maximum phonation time is significantly reduced when compared to his age matched peers. Vocal intensity and resonance are WFL\par \par Voice Handicap Index:  Mrs. Telles had a self-rating score of 42/120, suggesting a moderate impact upon lifestyle.\par \par IMPRESSIONS: Dysphonia\par \par PROGNOSIS: Guarded due to current smoking status\par \par RECOMMENDATIONS:\par 1) A short course of Voice Therapy (CPT - 87589) is recommended 1x per week, for 4-6 weeks, to maximize vocal function\par 2) Maintain good vocal hygiene and reflux precautions, as reviewed with patient upon completion of today's evaluation. Written educational handouts were provided.\par 3) Follow up with referring MD as directed.\par \par EDUCATION: The above recommendations were discussed with the patient. Mr. Telles verbalized good understanding.\par \par Should you have additional questions/concerns, please contact this office at [phone].\par \par Marla Sommers M.S., CCC-SLP

## 2022-10-20 ENCOUNTER — APPOINTMENT (OUTPATIENT)
Dept: OTOLARYNGOLOGY | Facility: CLINIC | Age: 67
End: 2022-10-20

## 2022-10-20 PROCEDURE — 92507 TX SP LANG VOICE COMM INDIV: CPT | Mod: GN

## 2022-10-27 ENCOUNTER — APPOINTMENT (OUTPATIENT)
Dept: OTOLARYNGOLOGY | Facility: CLINIC | Age: 67
End: 2022-10-27

## 2022-10-27 PROCEDURE — 92507 TX SP LANG VOICE COMM INDIV: CPT | Mod: GN

## 2022-11-02 ENCOUNTER — NON-APPOINTMENT (OUTPATIENT)
Age: 67
End: 2022-11-02

## 2022-11-02 ENCOUNTER — APPOINTMENT (OUTPATIENT)
Dept: PULMONOLOGY | Facility: CLINIC | Age: 67
End: 2022-11-02

## 2022-11-02 PROCEDURE — 99442: CPT

## 2022-11-11 ENCOUNTER — NON-APPOINTMENT (OUTPATIENT)
Age: 67
End: 2022-11-11

## 2022-12-19 ENCOUNTER — APPOINTMENT (OUTPATIENT)
Dept: PULMONOLOGY | Facility: CLINIC | Age: 67
End: 2022-12-19

## 2022-12-19 VITALS
BODY MASS INDEX: 34.27 KG/M2 | TEMPERATURE: 97.8 F | WEIGHT: 253 LBS | DIASTOLIC BLOOD PRESSURE: 80 MMHG | SYSTOLIC BLOOD PRESSURE: 116 MMHG | HEIGHT: 72 IN | RESPIRATION RATE: 16 BRPM | OXYGEN SATURATION: 94 % | HEART RATE: 89 BPM

## 2022-12-19 PROCEDURE — 99215 OFFICE O/P EST HI 40 MIN: CPT | Mod: 25

## 2022-12-19 PROCEDURE — 99407 BEHAV CHNG SMOKING > 10 MIN: CPT | Mod: 25

## 2022-12-19 PROCEDURE — 90662 IIV NO PRSV INCREASED AG IM: CPT

## 2022-12-19 PROCEDURE — G0008: CPT

## 2022-12-19 RX ORDER — UMECLIDINIUM BROMIDE AND VILANTEROL TRIFENATATE 62.5; 25 UG/1; UG/1
62.5-25 POWDER RESPIRATORY (INHALATION)
Qty: 3 | Refills: 1 | Status: DISCONTINUED | COMMUNITY
Start: 2022-05-23 | End: 2022-12-19

## 2022-12-19 RX ORDER — PANTOPRAZOLE 40 MG/1
40 TABLET, DELAYED RELEASE ORAL
Qty: 30 | Refills: 1 | Status: DISCONTINUED | COMMUNITY
Start: 2022-03-02 | End: 2022-12-19

## 2022-12-19 NOTE — ASSESSMENT
[FreeTextEntry1] : Mr. CRISTAL BERGMAN is a 66 year old former smoker (>30 pack year hx, quit 10/2021) w Schizophrenia, COPD, NIKIA  here for f/u. \par \par #COPD - mod, Gold Gr A\par -- We will change from Anoro to Trelegy frequent albuterol use and poorly controlled symptoms\par - Exercise oximetry on RA last visit: at rest  - P72: O2 90%; with exertion - P73: O2: 94 %\par \par #Smoker- unfortunately started smoking again. Encouraged quitting. Support provided\par --Tobacco control center referral provided\par -- LDCT 10/2022 w mild emphysema, postsurgical changes, stable herniation of the right lower lobe through the the ribs. There is mild stable aortic aneurysm measuring approximately 4.3 cm\par There are no new or concerning nodules.\par -- Repeat CT chest in 1 year\par \par #Sleep disordered breathing- patient with obstructive and central apneas on PSG but short total sleep time. AHI of 112 may be artificially increased.\par -- Patient never went for follow-up sleep study.  Was prescribed AutoPap but has not been using.\par \par #Dilated ascending aortia - noted on CT, stable\par -- still needs cardiology appointment, patient aware\par \par #HCM -up-to-date with pneumococcal vaccination; COVID vaccinated, boosted; flu shot today\par \par All questions answered. Patient in agreement with plan. \par Follow up in 3-4 mo  or sooner if needed.\par

## 2022-12-19 NOTE — CONSULT LETTER
[Dear  ___] : Dear  [unfilled], [Consult Letter:] : I had the pleasure of evaluating your patient, [unfilled]. [Please see my note below.] : Please see my note below. [Consult Closing:] : Thank you very much for allowing me to participate in the care of this patient.  If you have any questions, please do not hesitate to contact me. [FreeTextEntry3] : Sincerely,\par \par Gayla Bedolla MD\par [facility] Physician [facility]\par Pulmonary Medicine\par tel: [phone]\par fax: [phone]\par

## 2022-12-19 NOTE — HISTORY OF PRESENT ILLNESS
[Current] : current [>= 20 pack years] : >= 20 pack years [Never] : never [TextBox_4] : Mr. CRISTAL BERGMAN is a 67 year old smoker (>30 pack year hx, quit 10/2021) with Schizophrenia, COPD here for f/u.\par \par Has been following w ENT and speech.\par \par Still smoking - about 1ppd\par Using Anoro. Breathing is "so so". Using Albuterol 2-3 times/day. He is not wheezing. He is coughing a lot. \par He can walk for a long time on flat surface, walks slowly. \par Denies ER/UC visits; no steroid use. \par \par He has aPAP - not using it, can't sleep with it. \par \par PFTs 4/2022 - mod obstruction, normal volumes, normal DLCO.\par CT chest 10/2021 ([facility]) - RLL scarring/atelectasis, stable aortic aneurism, no concerning nodules\par \par PMH: rib trauma, hip replacement, Schitzophrenia, HTN\par Meds: Losartan, Abilify, Metoprolol\par All: Penicillin\par SH: former smoker, quit 2021, 33 pack year hx; works as pizza \par FH: dad - Afib\par PMD: NIRAV HECTORIK\par Immunizations: covid vaccinated and boosted. did not have flu shot\par  [TextBox_11] : 133

## 2022-12-19 NOTE — COUNSELING
[Cessation strategies including cessation program discussed] : Cessation strategies including cessation program discussed [Use of nicotine replacement therapies and other medications discussed] : Use of nicotine replacement therapies and other medications discussed [Encouraged to pick a quit date and identify support needed to quit] : Encouraged to pick a quit date and identify support needed to quit [No] : Not willing to quit smoking [FreeTextEntry3] : 11

## 2022-12-19 NOTE — PHYSICAL EXAM
[No Acute Distress] : no acute distress [Normal Oropharynx] : normal oropharynx [Normal Appearance] : normal appearance [No Neck Mass] : no neck mass [Normal Rate/Rhythm] : normal rate/rhythm [Normal S1, S2] : normal s1, s2 [No Murmurs] : no murmurs [No Resp Distress] : no resp distress [No Abnormalities] : no abnormalities [Benign] : benign [Normal Gait] : normal gait [No Clubbing] : no clubbing [No Cyanosis] : no cyanosis [FROM] : FROM [1+ Pitting] : 1+ pitting [Normal Color/ Pigmentation] : normal color/ pigmentation [No Focal Deficits] : no focal deficits [Oriented x3] : oriented x3 [Normal Affect] : normal affect [TextBox_68] : Good air movement.  Mild wheeze

## 2023-01-09 ENCOUNTER — RX RENEWAL (OUTPATIENT)
Age: 68
End: 2023-01-09

## 2023-01-09 ENCOUNTER — APPOINTMENT (OUTPATIENT)
Dept: OTOLARYNGOLOGY | Facility: CLINIC | Age: 68
End: 2023-01-09
Payer: MEDICARE

## 2023-01-09 VITALS
DIASTOLIC BLOOD PRESSURE: 89 MMHG | BODY MASS INDEX: 33.86 KG/M2 | HEIGHT: 72 IN | WEIGHT: 250 LBS | SYSTOLIC BLOOD PRESSURE: 139 MMHG | HEART RATE: 84 BPM

## 2023-01-09 PROCEDURE — 99213 OFFICE O/P EST LOW 20 MIN: CPT | Mod: 25

## 2023-01-09 PROCEDURE — 31575 DIAGNOSTIC LARYNGOSCOPY: CPT

## 2023-01-09 NOTE — PROCEDURE
[Hoarseness] : hoarseness not clearly evaluated by indirect laryngoscopy [None] : none [Flexible Endoscope] : examined with the flexible endoscope [de-identified] : Procedure: Flexible Fiberoptic Laryngoscopy: Risks, benefits, and alternatives of flexible laryngoscopy were explained to the patient. The patient gave oral consent to proceed. The flexible scope was inserted into the left nasal cavity and advanced towards the nasopharynx. Visualized mucosa over the turbinates and septum were as described as above. The nasopharynx was clear. Oropharyngeal walls were symmetric and mobile without lesion, mass, or edema. Hypopharynx was also without lesion or edema. Larynx was mobile without lesions. Supraglottic structures were free of edema, mass, and asymmetry. Jessica's edema of both cords, narrow larynx, soft tissue edema, no distinct lesions or growths. Base of tongue was within normal limits. Very narrow at level of tonsils.   [de-identified] : check vocal cords

## 2023-01-09 NOTE — HISTORY OF PRESENT ILLNESS
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 Pt notes he cut back, but is still smoking. Pt notes he hasn't been using CPAP machine. Pt notes he went to speech therapy, however there isn't much more improvement if he keeps smoking. Pt notes he has been following reflux diet.

## 2023-01-09 NOTE — PHYSICAL EXAM
[Hearing Sage Test (Tuning Fork On Forehead)] : no lateralization of tone [] : septum deviated bilaterally [Midline] : trachea located in midline position [Normal] : orientation to person, place, and time: normal [FreeTextEntry8] : minimal cerumen removed via curettage  [de-identified] : deviated septum b/l r>l [de-identified] : diffuse erythema, crowded airway, soft palate is up against the tongue [FreeTextEntry2] : sinuses nontender to percussion.

## 2023-01-09 NOTE — ASSESSMENT
[FreeTextEntry1] : Reviewed and reconciled medications, allergies, PMHx, PSHx, SocHx, FMHx.\par \par h/o reflux, dysphagia, snoring, globus sensation, NIKIA, s/p excision biopsy of vocal cord lesion 09/20/22 presents today to check throat\par \par path: squamous papillomas (benign lesion)\par \par Physical Exam -\par diffuse erythema, crowded airway, soft palate is up against the tongue\par deviated septum b/l r>l\par cerumen removed right \par \par Flexible laryngoscopy \par narrow at level of tonsils, BOT normal, epiglottis normal, Jessica's edema of both cords, narrow larynx, soft tissue edema, no distinct lesions or growths\par \par Plan:\par Cerumen removed. Flexible laryngoscopy. Continue reflux diet and trying to stop smoking. FU 4 months.

## 2023-01-09 NOTE — ADDENDUM
[FreeTextEntry1] : Documented by Munira Benjamin acting as scribe for Dr. Sim on 01/09/2023.\par \par All Medical record entries made by the scribe were at my, Dr. Sim,direction and personally dictated by me on 01/09/2023. I have reviewed the chart and agree that the record accurately reflects my personal performance of the history, physical exam, assessment and plan. I have also personally directed, reviewed, and agreed with the discharge instructions.

## 2023-02-07 ENCOUNTER — RX CHANGE (OUTPATIENT)
Age: 68
End: 2023-02-07

## 2023-02-13 ENCOUNTER — APPOINTMENT (OUTPATIENT)
Dept: CARDIOLOGY | Facility: CLINIC | Age: 68
End: 2023-02-13

## 2023-02-20 ENCOUNTER — APPOINTMENT (OUTPATIENT)
Dept: CARDIOLOGY | Facility: CLINIC | Age: 68
End: 2023-02-20
Payer: MEDICARE

## 2023-02-20 VITALS
DIASTOLIC BLOOD PRESSURE: 80 MMHG | OXYGEN SATURATION: 93 % | SYSTOLIC BLOOD PRESSURE: 123 MMHG | TEMPERATURE: 99 F | WEIGHT: 252 LBS | HEIGHT: 72 IN | HEART RATE: 92 BPM | BODY MASS INDEX: 34.13 KG/M2

## 2023-02-20 DIAGNOSIS — Z96.641 PRESENCE OF RIGHT ARTIFICIAL HIP JOINT: ICD-10-CM

## 2023-02-20 DIAGNOSIS — Z98.890 OTHER SPECIFIED POSTPROCEDURAL STATES: ICD-10-CM

## 2023-02-20 PROCEDURE — 99213 OFFICE O/P EST LOW 20 MIN: CPT

## 2023-02-20 NOTE — ASSESSMENT
[FreeTextEntry1] : 67 years old male with hypertension dyslipidemia and small ascending aortic aneurysm 4.3 cm comes to the office for routine follow-up .patient's medications reviewed and patient will continue Norvasc and metoprolol.  Patient was advised for repeat CAT scan of the chest for evaluation of ascending aorta take aneurysm will be done in October 2023 this was all discussed with the patient in great detail

## 2023-02-20 NOTE — REASON FOR VISIT
[Hyperlipidemia] : hyperlipidemia [Hypertension] : hypertension [FreeTextEntry1] : 67 years old male with hypertension dyslipidemia, status post thoracotomy right chest, status post total hip on the right comes to the office for routine follow-up.  In addition this patient has had ascending aortic aneurysm 4.3 cm–CAT scan done October 2022

## 2023-03-17 ENCOUNTER — APPOINTMENT (OUTPATIENT)
Dept: PULMONOLOGY | Facility: CLINIC | Age: 68
End: 2023-03-17
Payer: MEDICARE

## 2023-03-17 VITALS
HEART RATE: 69 BPM | BODY MASS INDEX: 33.86 KG/M2 | OXYGEN SATURATION: 97 % | TEMPERATURE: 98 F | WEIGHT: 250 LBS | DIASTOLIC BLOOD PRESSURE: 87 MMHG | SYSTOLIC BLOOD PRESSURE: 137 MMHG | HEIGHT: 72 IN | RESPIRATION RATE: 15 BRPM

## 2023-03-17 PROCEDURE — 99215 OFFICE O/P EST HI 40 MIN: CPT | Mod: 25

## 2023-03-17 PROCEDURE — G0296 VISIT TO DETERM LDCT ELIG: CPT

## 2023-03-17 NOTE — CONSULT LETTER
[Dear  ___] : Dear  [unfilled], [Consult Letter:] : I had the pleasure of evaluating your patient, [unfilled]. [Please see my note below.] : Please see my note below. [Consult Closing:] : Thank you very much for allowing me to participate in the care of this patient.  If you have any questions, please do not hesitate to contact me. [FreeTextEntry3] : Sincerely,\par \par Gayla Bedolla MD\par [facility] Physician [facility]\par Pulmonary Medicine\par tel: [phone]\par fax: [phone]\par

## 2023-03-17 NOTE — ASSESSMENT
[FreeTextEntry1] : Mr. CRISTAL BERGMAN is a 66 year old former smoker (>30 pack year hx, quit 10/2021) w Schizophrenia, COPD, NIKIA  here for f/u. \par \par #COPD - mod, Gold Gr A\par -- c/w  Trelegy \par -- repeat PFTs\par - Exercise oximetry on RA last visitt: at rest  - P72: O2 90%; with exertion - P73: O2: 94 %\par \par #Smoker- unfortunately started smoking again. Encouraged quitting. Support provided\par --Tobacco control center referral provided\par -- LDCT 10/2022 w mild emphysema, postsurgical changes, stable herniation of the right lower lobe through the the ribs. There is mild stable aortic aneurysm measuring approximately 4.3 cm\par There are no new or concerning nodules.\par -- Repeat CT chest in 1 year\par \par #Sleep disordered breathing- patient with obstructive and central apneas on PSG but short total sleep time. AHI of 112 may be artificially increased.\par -- Patient never went for follow-up sleep study.  Was prescribed AutoPap but has not been using.\par -- Strongly encouraged another trial of PAP therapy\par -- Briefly discussed Inspire.  Patient is not interested at this time\par \par #HCM -up-to-date with pneumococcal vaccination; COVID vaccinated, boosted;\par \par All questions answered. Patient in agreement with plan. \par Follow up in 3-4 mo  or sooner if needed.\par

## 2023-03-17 NOTE — PHYSICAL EXAM
[No Acute Distress] : no acute distress [Normal Oropharynx] : normal oropharynx [Normal Appearance] : normal appearance [No Neck Mass] : no neck mass [Normal Rate/Rhythm] : normal rate/rhythm [Normal S1, S2] : normal s1, s2 [No Murmurs] : no murmurs [No Resp Distress] : no resp distress [No Abnormalities] : no abnormalities [Benign] : benign [Normal Gait] : normal gait [No Clubbing] : no clubbing [No Cyanosis] : no cyanosis [FROM] : FROM [1+ Pitting] : 1+ pitting [Normal Color/ Pigmentation] : normal color/ pigmentation [No Focal Deficits] : no focal deficits [Oriented x3] : oriented x3 [Normal Affect] : normal affect [Clear to Auscultation Bilaterally] : clear to auscultation bilaterally [TextBox_68] : Good air movement

## 2023-03-17 NOTE — HISTORY OF PRESENT ILLNESS
[Current] : current [>= 20 pack years] : >= 20 pack years [Never] : never [TextBox_4] : Mr. CRISTAL BERGMAN is a 67 year old smoker (>30 pack year hx, quit 10/2021) with Schizophrenia, COPD here for f/u.\par \par Still smoking - about 1ppd\par Last visit started on Trelegy.  Notes some improvement in respiratory status.  Reports rare albuterol use\par He can walk for a long time on flat surface, walks slowly. \par Denies ER/UC visits; no steroid use. \par \par He has aPAP - not using it, can't sleep with it. 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 did not have flu shot\par  [TextBox_11] : 133

## 2023-03-17 NOTE — COUNSELING
[Cessation strategies including cessation program discussed] : Cessation strategies including cessation program discussed [Use of nicotine replacement therapies and other medications discussed] : Use of nicotine replacement therapies and other medications discussed [Encouraged to pick a quit date and identify support needed to quit] : Encouraged to pick a quit date and identify support needed to quit [No] : Not willing to quit smoking [ - Annual Lung Cancer Screening/Share Decision Making Discussion] : Annual Lung Cancer Screening/Share Decision Making Discussion. (I have advised this patient to have a Low Dose CT (LDCT) scan of the lungs and have discussed the following with the patient in a shared decision making discussion:   Benefits of Detection and Early Treatment: There is adequate evidence that annual screening for lung cancer with LDCT in a population of high-risk persons can prevent a substantial number of lung cancer–related deaths. The magnitude of benefit depends on the individual patient's risk for lung cancer, as those who are at highest risk are most likely to benefit. Screening cannot prevent most lung cancer–related deaths, and does not replace smoking cessation. Harms of Detection and Early Intervention and Treatment: The harms associated with LDCT screening include false-negative and false-positive results, incidental findings, over diagnosis, and radiation exposure. False-positive LDCT results occur in a substantial proportion of screened persons; 95% of all positive results do not lead to a diagnosis of cancer. In a high-quality screening program, further imaging can resolve most false-positive results; however, some patients may require invasive procedures. Radiation harms, including cancer resulting from cumulative exposure to radiation, vary depending on the age at the start of screening; the number of scans received; and the person's exposure to other sources of radiation, particularly other medical imaging.) [FreeTextEntry3] : 11

## 2023-04-17 ENCOUNTER — APPOINTMENT (OUTPATIENT)
Dept: OTOLARYNGOLOGY | Facility: CLINIC | Age: 68
End: 2023-04-17
Payer: MEDICARE

## 2023-04-17 VITALS
WEIGHT: 245 LBS | HEIGHT: 73 IN | SYSTOLIC BLOOD PRESSURE: 113 MMHG | BODY MASS INDEX: 32.47 KG/M2 | HEART RATE: 79 BPM | DIASTOLIC BLOOD PRESSURE: 75 MMHG

## 2023-04-17 DIAGNOSIS — J38.1 POLYP OF VOCAL CORD AND LARYNX: ICD-10-CM

## 2023-04-17 PROCEDURE — 31575 DIAGNOSTIC LARYNGOSCOPY: CPT

## 2023-04-17 PROCEDURE — 99213 OFFICE O/P EST LOW 20 MIN: CPT | Mod: 25

## 2023-04-17 NOTE — HISTORY OF PRESENT ILLNESS
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 Pt notes he is still smoking. Pt notes he uses CPAP machine very occasionally, notes he can't sleep with the noise from the machine. Pt notes he lost about 15 pounds which seems to be helping him sleep better.

## 2023-04-17 NOTE — ASSESSMENT
[FreeTextEntry1] : Reviewed and reconciled medications, allergies, PMHx, PSHx, SocHx, FMHx.\par \par Pt presents with  h/o smoking, reflux, dysphagia, snoring, globus sensation, NIKIA - has a CPAP machine, s/p excision biopsy of vocal cord lesion 09/20/22 presents today for 4 month follow up on throat. \par \par Physical Exam -\par cerumen removed via curettage b/l\par type 3 oral cavity\par deviated septum left midportion, deviated septum right along the floor\par \par Flexible laryngoscopy \par narrowing airway at level of soft palate and uvula, epiglottis normal, BOT normal, no reoccurrence of vc lesion\par \par Plan: \par Cerumen removed b/l. Flexible laryngoscopy. Continue reflux diet. \par FU 3 months.

## 2023-04-17 NOTE — PROCEDURE
[Hoarseness] : hoarseness not clearly evaluated by indirect laryngoscopy [Complicated Symptoms] : complicated symptoms requiring more thorough examination than provided by mirror [None] : none [Flexible Endoscope] : examined with the flexible endoscope [de-identified] : Procedure: Flexible Fiberoptic Laryngoscopy: Risks, benefits, and alternatives of flexible laryngoscopy were explained to the patient. The patient gave oral consent to proceed. The flexible scope was inserted into the right nasal cavity and advanced towards the nasopharynx. Visualized mucosa over the turbinates and septum were as described as above. The nasopharynx was clear. narrowing airway at level of soft palate and uvula. Oropharyngeal walls were symmetric and mobile without lesion, mass, or edema. Hypopharynx was also without lesion or edema. Larynx was mobile without lesions. Supraglottic structures were free of edema, mass, and asymmetry. True vocal folds were white without mass or lesion -- no reoccurrence of vc lesion. Base of tongue was within normal limits.  [de-identified] : check vocal cords

## 2023-04-17 NOTE — PHYSICAL EXAM
[Hearing Sage Test (Tuning Fork On Forehead)] : no lateralization of tone [Midline] : trachea located in midline position [Normal] : orientation to person, place, and time: normal [FreeTextEntry8] : cerumen removed via curettage  [FreeTextEntry9] : cerumen removed via curettage  [de-identified] : deviated septum left midportion, deviated septum right along the floor [de-identified] : type 3 oral cavity

## 2023-04-24 ENCOUNTER — RX RENEWAL (OUTPATIENT)
Age: 68
End: 2023-04-24

## 2023-05-08 ENCOUNTER — RX RENEWAL (OUTPATIENT)
Age: 68
End: 2023-05-08

## 2023-05-10 ENCOUNTER — APPOINTMENT (OUTPATIENT)
Dept: DERMATOLOGY | Facility: CLINIC | Age: 68
End: 2023-05-10
Payer: MEDICARE

## 2023-05-10 DIAGNOSIS — Z80.8 FAMILY HISTORY OF MALIGNANT NEOPLASM OF OTHER ORGANS OR SYSTEMS: ICD-10-CM

## 2023-05-10 DIAGNOSIS — L27.0 GENERALIZED SKIN ERUPTION DUE TO DRUGS AND MEDICAMENTS TAKEN INTERNALLY: ICD-10-CM

## 2023-05-10 PROCEDURE — 99214 OFFICE O/P EST MOD 30 MIN: CPT

## 2023-05-10 NOTE — PHYSICAL EXAM
[Alert] : alert [Oriented x 3] : ~L oriented x 3 [Well Nourished] : well nourished [FreeTextEntry3] : Patches of erythematous fine papules, anterior to the bilateral axilla, proximal UE's, lumbar region of the back and the bilateral thighs.

## 2023-05-10 NOTE — HISTORY OF PRESENT ILLNESS
[FreeTextEntry1] : Rash. [de-identified] : Began after taking an antibiotic from his urologist (? clindamycin).  Also reacted to a previous antibiotic.

## 2023-05-10 NOTE — ASSESSMENT
[FreeTextEntry1] : Drug eruption.\par ? etiology - possibly clindamycin.\par Will obtain records from his urologist, so we can appropriately label his allergies.\par Cutivate cream bid.\par Discussed at length.

## 2023-06-05 ENCOUNTER — APPOINTMENT (OUTPATIENT)
Dept: CARDIOLOGY | Facility: CLINIC | Age: 68
End: 2023-06-05

## 2023-06-16 ENCOUNTER — RX RENEWAL (OUTPATIENT)
Age: 68
End: 2023-06-16

## 2023-07-17 ENCOUNTER — APPOINTMENT (OUTPATIENT)
Dept: PULMONOLOGY | Facility: CLINIC | Age: 68
End: 2023-07-17
Payer: MEDICARE

## 2023-07-17 PROCEDURE — 94010 BREATHING CAPACITY TEST: CPT

## 2023-07-17 PROCEDURE — 94729 DIFFUSING CAPACITY: CPT

## 2023-07-17 PROCEDURE — 94727 GAS DIL/WSHOT DETER LNG VOL: CPT

## 2023-07-19 ENCOUNTER — APPOINTMENT (OUTPATIENT)
Dept: OTOLARYNGOLOGY | Facility: CLINIC | Age: 68
End: 2023-07-19
Payer: MEDICARE

## 2023-07-19 VITALS
HEART RATE: 74 BPM | BODY MASS INDEX: 30.48 KG/M2 | WEIGHT: 230 LBS | HEIGHT: 73 IN | SYSTOLIC BLOOD PRESSURE: 122 MMHG | DIASTOLIC BLOOD PRESSURE: 81 MMHG

## 2023-07-19 DIAGNOSIS — D14.1 BENIGN NEOPLASM OF LARYNX: ICD-10-CM

## 2023-07-19 PROCEDURE — 99213 OFFICE O/P EST LOW 20 MIN: CPT | Mod: 25

## 2023-07-19 PROCEDURE — 31575 DIAGNOSTIC LARYNGOSCOPY: CPT

## 2023-07-19 NOTE — ASSESSMENT
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 \par \par Physical Exam -\par cerumen removed b/l, not impacted\par elongated floppy uvula, type 3 oral cavity, isi exostosis off the mandible, tonsils class 1\par deviated septum r>l\par mildly inflamed turbinates \par \par Flexible laryngoscopy \par mild diffuse edema of the larynx, no lesions, no growths, no reoccurrence\par \par Plan: \par Cerumen removed b/l. Flexible laryngoscopy. Pt urged to stop smoking. Continue reflux management. FU 4 months.

## 2023-07-19 NOTE — ADDENDUM
[FreeTextEntry1] : Documented by Munira Benjamin acting as scribe for Dr. Sim on 07/19/2023.\par \par All Medical record entries made by the scribe were at my, Dr. Sim,direction and personally dictated by me on 07/19/2023. I have reviewed the chart and agree that the record accurately reflects my personal performance of the history, physical exam, assessment and plan. I have also personally directed, reviewed, and agreed with the discharge instructions.

## 2023-07-19 NOTE — HISTORY OF PRESENT ILLNESS
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 Pt notes his throat feels okay. Notes he is still smoking 1 pack of cigarettes per day. Pt denies using CPAP machine. Pt notes he weights around 230 lbs.

## 2023-07-19 NOTE — CONSULT LETTER
Daily Note     Today's date: 7/15/2019  Patient name: Zi Alvarado  : 1960  MRN: [id number]  Referring provider: Christi Evans MD  Dx:   Encounter Diagnosis     ICD-10-CM    1  Acute pain of right shoulder M25 511                   Subjective: "I went to a class at the gym and I actually did pretty well "      Objective: See treatment diary below  Manual  6/10 6/12 6/17 6/19 6/24 6/26 7/8 7/10       Ant/inf shiloh santana MD, MD, MD, MD, MD, MD  [place]-  PROM                                                                                     Exercise Diary  6/10 6/12 6/17 6/19 6/24 6/26 7/8 7/10 7/15    UBE UBE 3'/3' Bike today  UBE 3'/3' UBE  3'/3' 3'/3' 3'/3' 3'/3' 3'/3' 3'/3'    Pulleys (flex/ab) 10x10" ea 10x10" ea 10x10" ea 10x10"  ea 10x10" ea 10x10" ea 10x10" ea 10x10" ea 10x10" ea    Cane AAROM (ext/IR) 10x10" ea 10x10" ea 10x10" ea 10x10"  ea 10x10" ea 10x10" ea 10x10" ea 10x10"  ea 10x10" ea    Supine cane AAROM (flex/ab/ER) 10x10" ea 10x10" ea 10x10" ea 10x10"  ea 10x10" ea 10x10" ea 10x10" ea 10"x10  ea 10"x10 ea    Posture tband      GRN x15ea GRN x15ea Grn x20 ea Grn x20 ea Grn x20ea    Supine flexion   x15 x15 x20 x20 x20 x20 x20    Supine serratus punch  x15 x15 x15 x20 x20 x20 x20 x20    Wall push ups   x15 x15 x15 x20 x20 x20 x20    Ball on wall (CW/CCW)         x20ea    Scap wall slides         x20     3 way arm lifts      x15ea  x15ea  x15ea  x20 ea  x20ea 1# x15  ea 1#x20ea           Assessment: Pt had good tolerance to progression of program  No complaints of pain and nearly full ROM noted  Anticipate DC next session  Plan: Continue per plan of care  [Dear  ___] : Dear  [unfilled], [Courtesy Letter:] : I had the pleasure of seeing your patient, [unfilled], in my office today. [Please see my note below.] : Please see my note below. [Consult Closing:] : Thank you very much for allowing me to participate in the care of this patient.  If you have any questions, please do not hesitate to contact me. [Sincerely,] : Sincerely, [FreeTextEntry3] : Ricardo Sim MD FACS

## 2023-07-19 NOTE — PROCEDURE
[Hoarseness] : hoarseness not clearly evaluated by indirect laryngoscopy [None] : none [Flexible Endoscope] : examined with the flexible endoscope [de-identified] : Procedure: Flexible Fiberoptic Laryngoscopy: Risks, benefits, and alternatives of flexible laryngoscopy were explained to the patient. The patient gave oral consent to proceed. The flexible scope was inserted into the left nasal cavity and advanced towards the nasopharynx. Visualized mucosa over the turbinates and septum were as described as above. The nasopharynx was clear. Oropharyngeal walls were symmetric and mobile without lesion, mass, or edema. Hypopharynx was also without lesion or edema. mild diffuse edema of the larynx. Base of tongue was within normal limits.  [de-identified] : check vocal cords

## 2023-07-19 NOTE — PHYSICAL EXAM
[Hearing Sage Test (Tuning Fork On Forehead)] : no lateralization of tone [Normal] : mucosa is normal [FreeTextEntry8] : cerumen removed via curettage, not impacted  [FreeTextEntry9] : cerumen removed via curettage, not impacted  [de-identified] : deviated septum r>l [de-identified] : mildly inflamed turbinates  [FreeTextEntry1] : elongated floppy uvula, type 3 oral cavity, isi exostosis off the mandible, tonsils class 1

## 2023-09-01 ENCOUNTER — APPOINTMENT (OUTPATIENT)
Dept: CARDIOLOGY | Facility: CLINIC | Age: 68
End: 2023-09-01
Payer: MEDICARE

## 2023-09-01 VITALS
SYSTOLIC BLOOD PRESSURE: 138 MMHG | WEIGHT: 226 LBS | OXYGEN SATURATION: 94 % | HEART RATE: 76 BPM | BODY MASS INDEX: 29.95 KG/M2 | DIASTOLIC BLOOD PRESSURE: 89 MMHG | HEIGHT: 73 IN

## 2023-09-01 PROCEDURE — 99213 OFFICE O/P EST LOW 20 MIN: CPT

## 2023-09-01 NOTE — REASON FOR VISIT
[Hyperlipidemia] : hyperlipidemia [Hypertension] : hypertension [FreeTextEntry1] : 67 years old male with hypertension dyslipidemia, status post thoracotomy right chest, status post total hip on the right comes to the office for routine follow-up.  In addition this patient has had ascending aortic aneurysm 4.3 cm-CAT scan done October 2022

## 2023-09-01 NOTE — ASSESSMENT
[FreeTextEntry1] : 67 years old male with hypertension dyslipidemia and small ascending aortic aneurysm 4.3 cm comes to the office for routine follow-up .patient's medications reviewed and patient will continue Norvasc and metoprolol.

## 2023-09-22 ENCOUNTER — APPOINTMENT (OUTPATIENT)
Dept: DERMATOLOGY | Facility: CLINIC | Age: 68
End: 2023-09-22
Payer: MEDICARE

## 2023-09-22 VITALS — BODY MASS INDEX: 30.48 KG/M2 | HEIGHT: 73 IN | WEIGHT: 230 LBS

## 2023-09-22 DIAGNOSIS — L02.91 CUTANEOUS ABSCESS, UNSPECIFIED: ICD-10-CM

## 2023-09-22 PROCEDURE — 11900 INJECT SKIN LESIONS </W 7: CPT

## 2023-09-22 PROCEDURE — 10060 I&D ABSCESS SIMPLE/SINGLE: CPT

## 2023-11-21 ENCOUNTER — RX RENEWAL (OUTPATIENT)
Age: 68
End: 2023-11-21

## 2023-11-27 ENCOUNTER — RX RENEWAL (OUTPATIENT)
Age: 68
End: 2023-11-27

## 2023-12-04 ENCOUNTER — APPOINTMENT (OUTPATIENT)
Dept: CARDIOLOGY | Facility: CLINIC | Age: 68
End: 2023-12-04
Payer: MEDICARE

## 2023-12-04 VITALS
DIASTOLIC BLOOD PRESSURE: 87 MMHG | OXYGEN SATURATION: 95 % | BODY MASS INDEX: 29.42 KG/M2 | HEART RATE: 72 BPM | WEIGHT: 222 LBS | TEMPERATURE: 98.2 F | SYSTOLIC BLOOD PRESSURE: 140 MMHG | HEIGHT: 73 IN

## 2023-12-04 PROCEDURE — 99213 OFFICE O/P EST LOW 20 MIN: CPT

## 2023-12-05 ENCOUNTER — APPOINTMENT (OUTPATIENT)
Dept: OTOLARYNGOLOGY | Facility: CLINIC | Age: 68
End: 2023-12-05
Payer: MEDICARE

## 2023-12-05 VITALS — BODY MASS INDEX: 30.88 KG/M2 | WEIGHT: 233 LBS | HEIGHT: 73 IN

## 2023-12-05 VITALS — HEART RATE: 73 BPM | SYSTOLIC BLOOD PRESSURE: 138 MMHG | DIASTOLIC BLOOD PRESSURE: 87 MMHG

## 2023-12-05 DIAGNOSIS — B37.89 OTHER SITES OF CANDIDIASIS: ICD-10-CM

## 2023-12-05 DIAGNOSIS — B37.0 CANDIDAL STOMATITIS: ICD-10-CM

## 2023-12-05 PROCEDURE — 99213 OFFICE O/P EST LOW 20 MIN: CPT | Mod: 25

## 2023-12-05 PROCEDURE — 31575 DIAGNOSTIC LARYNGOSCOPY: CPT

## 2024-01-26 ENCOUNTER — APPOINTMENT (OUTPATIENT)
Dept: PULMONOLOGY | Facility: CLINIC | Age: 69
End: 2024-01-26
Payer: MEDICARE

## 2024-01-26 VITALS
BODY MASS INDEX: 30.53 KG/M2 | TEMPERATURE: 97.7 F | OXYGEN SATURATION: 96 % | HEART RATE: 67 BPM | WEIGHT: 230.38 LBS | DIASTOLIC BLOOD PRESSURE: 79 MMHG | RESPIRATION RATE: 15 BRPM | SYSTOLIC BLOOD PRESSURE: 137 MMHG | HEIGHT: 73 IN

## 2024-01-26 PROCEDURE — 99215 OFFICE O/P EST HI 40 MIN: CPT

## 2024-01-26 PROCEDURE — 99407 BEHAV CHNG SMOKING > 10 MIN: CPT

## 2024-01-26 NOTE — CONSULT LETTER
[Dear  ___] : Dear  [unfilled], [Consult Letter:] : I had the pleasure of evaluating your patient, [unfilled]. [Please see my note below.] : Please see my note below. [Consult Closing:] : Thank you very much for allowing me to participate in the care of this patient.  If you have any questions, please do not hesitate to contact me. [FreeTextEntry3] : Sincerely,\par  \par  Gayla Bedolla MD\par  [facility] Physician [facility]\par  Pulmonary Medicine\par  tel: [phone]\par  fax: [phone]\par

## 2024-01-26 NOTE — HISTORY OF PRESENT ILLNESS
[Current] : current [>= 20 pack years] : >= 20 pack years [Never] : never [TextBox_4] : Mr. CRISTAL BERGMAN is a 67 year old smoker (>30 pack year hx, quit 10/2021) with Schizophrenia, COPD here for f/u.  Last seen March 2023 Still smoking - about 1ppd Compliant wTrelegy Notes some improvement in respiratory status.  Reports rare albuterol use Reports some shortness of breath with lifting heavy weights, 1 flight of stairs.  Reports chronic smoker's cough Denies ER/UC visits; no steroid use.   He has aPAP - not using it, can't sleep with it.   PFTs 4/2022 - mod obstruction, normal volumes, normal DLCO. 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 did not have flu shot  [TextBox_11] : 133

## 2024-01-26 NOTE — ASSESSMENT
[FreeTextEntry1] : Mr. CRISTAL BERGMAN is a 66 year old former smoker (>30 pack year hx, quit 10/2021) w Schizophrenia, COPD, NIKIA here for f/u.  #COPD - mod, Gold Gr A -- seems to be doing well on Trelegy. will continue (previously on Anoro) -- albuterol prn - Exercise oximetry on RA last visit: at rest - P72: O2 90%; with exertion - P73: O2: 94 %   #Smoker- unfortunately started smoking again. Encouraged quitting. 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 There is mild stable aortic aneurysm measuring approximately 4.3 cm -- Overdue for repeat CT chest - ordered  #Sleep disordered breathing- patient with obstructive and central apneas on PSG but short total sleep time. AHI of 112 may be artificially increased. -- Patient never went for follow-up sleep study. Was prescribed AutoPap but has not been using. This was again discussed with patient   #Dilated ascending aortia - noted on CT, stable -- still needs cardiology appointment, patient aware  #HCM -up-to-date with pneumococcal vaccination; COVID vaccinated, boosted;    All questions answered. Patient in agreement with plan.  Follow up in 3-4 mo or sooner if needed.

## 2024-01-26 NOTE — PHYSICAL EXAM
[No Acute Distress] : no acute distress [Normal Oropharynx] : normal oropharynx [Normal Appearance] : normal appearance [No Neck Mass] : no neck mass [Normal Rate/Rhythm] : normal rate/rhythm [Normal S1, S2] : normal s1, s2 [No Murmurs] : no murmurs [No Resp Distress] : no resp distress [Clear to Auscultation Bilaterally] : clear to auscultation bilaterally [No Abnormalities] : no abnormalities [Benign] : benign [Normal Gait] : normal gait [No Clubbing] : no clubbing [No Cyanosis] : no cyanosis [FROM] : FROM [1+ Pitting] : 1+ pitting [Normal Color/ Pigmentation] : normal color/ pigmentation [No Focal Deficits] : no focal deficits [Oriented x3] : oriented x3 [Normal Affect] : normal affect [TextBox_68] : Good air movement

## 2024-01-31 ENCOUNTER — NON-APPOINTMENT (OUTPATIENT)
Age: 69
End: 2024-01-31

## 2024-01-31 VITALS — BODY MASS INDEX: 30.48 KG/M2 | HEIGHT: 73 IN | WEIGHT: 230 LBS

## 2024-01-31 NOTE — HISTORY OF PRESENT ILLNESS
[Current] : Current [>=20 Pack-Years] : Twenty pack years or greater smoking history: Yes [TextBox_13] :  Referred by Dr. Gayla Bedolla.   Mr. CRISTAL BERGMAN  is a 68 year old man with a history of aortic aneurysm, COPD, HTN, NIKIA.   He was seen in the office by Dr. Bedolla for review of eligibility for, as well as, discussion of Low-Dose CT lung cancer screening program. Over the telephone today we reviewed and confirmed that the patient meets screening eligibility criteria: -Age: 68 year  Smoking status:  -Current smoker -Number of pack(s) per day: 46 -Number of years smoked: 1.5 -Number of pack years smokin   Mr. BERGMAN denies any signs or symptoms of lung cancer including new cough, change in cough, hemoptysis and unintentional weight loss.   Mr. BERGMAN denies any personal history of lung cancer. No lung cancer in a 1st degree relative. Denies any history of lung disease. Denies any history of occupational exposures  [PacksperYear] : 69

## 2024-02-06 ENCOUNTER — APPOINTMENT (OUTPATIENT)
Dept: CT IMAGING | Facility: CLINIC | Age: 69
End: 2024-02-06
Payer: MEDICARE

## 2024-02-06 PROCEDURE — 71271 CT THORAX LUNG CANCER SCR C-: CPT

## 2024-02-22 ENCOUNTER — RX RENEWAL (OUTPATIENT)
Age: 69
End: 2024-02-22

## 2024-02-23 ENCOUNTER — RX RENEWAL (OUTPATIENT)
Age: 69
End: 2024-02-23

## 2024-03-07 RX ORDER — FLUTICASONE FUROATE, UMECLIDINIUM BROMIDE AND VILANTEROL TRIFENATATE 100; 62.5; 25 UG/1; UG/1; UG/1
100-62.5-25 POWDER RESPIRATORY (INHALATION)
Qty: 3 | Refills: 1 | Status: ACTIVE | COMMUNITY
Start: 2022-12-19 | End: 1900-01-01

## 2024-03-09 ENCOUNTER — APPOINTMENT (OUTPATIENT)
Dept: CARDIOLOGY | Facility: CLINIC | Age: 69
End: 2024-03-09
Payer: MEDICARE

## 2024-03-09 VITALS
BODY MASS INDEX: 30.48 KG/M2 | DIASTOLIC BLOOD PRESSURE: 89 MMHG | RESPIRATION RATE: 18 BRPM | TEMPERATURE: 98.5 F | HEART RATE: 94 BPM | WEIGHT: 230 LBS | HEIGHT: 73 IN | OXYGEN SATURATION: 96 % | SYSTOLIC BLOOD PRESSURE: 127 MMHG

## 2024-03-09 PROCEDURE — 99213 OFFICE O/P EST LOW 20 MIN: CPT

## 2024-03-09 RX ORDER — AMLODIPINE BESYLATE 5 MG/1
5 TABLET ORAL
Qty: 90 | Refills: 0 | Status: DISCONTINUED | COMMUNITY
Start: 2022-03-31 | End: 2024-03-09

## 2024-03-09 RX ORDER — FLUCONAZOLE 200 MG/1
200 TABLET ORAL DAILY
Qty: 10 | Refills: 0 | Status: DISCONTINUED | COMMUNITY
Start: 2023-12-05 | End: 2024-03-09

## 2024-03-09 RX ORDER — FLUTICASONE PROPIONATE 0.5 MG/G
0.05 CREAM TOPICAL TWICE DAILY
Qty: 1 | Refills: 1 | Status: DISCONTINUED | COMMUNITY
Start: 2023-05-10 | End: 2024-03-09

## 2024-03-09 RX ORDER — DOXYCYCLINE HYCLATE 100 MG/1
100 CAPSULE ORAL
Qty: 20 | Refills: 0 | Status: DISCONTINUED | COMMUNITY
Start: 2023-09-22 | End: 2024-03-09

## 2024-03-09 RX ORDER — ALBUTEROL SULFATE 90 UG/1
108 (90 BASE) INHALANT RESPIRATORY (INHALATION)
Qty: 1 | Refills: 2 | Status: DISCONTINUED | COMMUNITY
Start: 2022-03-02 | End: 2024-03-09

## 2024-03-09 NOTE — PHYSICAL EXAM
[Well Nourished] : well nourished [Well Developed] : well developed [No Acute Distress] : no acute distress [Normal Conjunctiva] : normal conjunctiva [Normal S1, S2] : normal S1, S2 [Normal Venous Pressure] : normal venous pressure [No Carotid Bruit] : no carotid bruit [No Murmur] : no murmur [No Rub] : no rub [No Gallop] : no gallop [Clear Lung Fields] : clear lung fields [Good Air Entry] : good air entry [No Respiratory Distress] : no respiratory distress  [Soft] : abdomen soft [Normal Bowel Sounds] : normal bowel sounds [No Masses/organomegaly] : no masses/organomegaly [Non Tender] : non-tender [Normal Gait] : normal gait [No Edema] : no edema [No Clubbing] : no clubbing [No Cyanosis] : no cyanosis [No Rash] : no rash [No Varicosities] : no varicosities [No Skin Lesions] : no skin lesions [No Focal Deficits] : no focal deficits [Moves all extremities] : moves all extremities [Normal Speech] : normal speech [Normal memory] : normal memory [Alert and Oriented] : alert and oriented

## 2024-03-09 NOTE — REASON FOR VISIT
[Hypertension] : hypertension [Other: ____] : [unfilled] [FreeTextEntry1] : 68 years old male with hypertension, GERD and small ascending aortic aneurysm comes to the office for routine follow-up

## 2024-03-09 NOTE — ASSESSMENT
[FreeTextEntry1] : Patient's medications reviewed patient will continue present medications patient will need repeat CAT scan of the aorta for evaluation of aortic aneurysm in near future about 1 year

## 2024-04-08 ENCOUNTER — APPOINTMENT (OUTPATIENT)
Dept: CARDIOLOGY | Facility: CLINIC | Age: 69
End: 2024-04-08
Payer: MEDICARE

## 2024-04-08 VITALS
OXYGEN SATURATION: 96 % | BODY MASS INDEX: 30.48 KG/M2 | SYSTOLIC BLOOD PRESSURE: 120 MMHG | WEIGHT: 225 LBS | DIASTOLIC BLOOD PRESSURE: 80 MMHG | HEIGHT: 72 IN | TEMPERATURE: 96.4 F | HEART RATE: 81 BPM

## 2024-04-08 DIAGNOSIS — Z00.00 ENCOUNTER FOR GENERAL ADULT MEDICAL EXAMINATION W/OUT ABNORMAL FINDINGS: ICD-10-CM

## 2024-04-08 PROCEDURE — 99213 OFFICE O/P EST LOW 20 MIN: CPT

## 2024-04-08 NOTE — REASON FOR VISIT
[Hypertension] : hypertension [FreeTextEntry1] : 68 years old male with hypertension GERD comes to the office for routine follow-up.  Patient has a schizophrenia also.  Patient was found to have renal insufficiency

## 2024-04-08 NOTE — ASSESSMENT
[FreeTextEntry1] : Patient's medications reviewed patient will continue present medications patient will have blood work

## 2024-04-08 NOTE — REVIEW OF SYSTEMS
[Negative] : Heme/Lymph [de-identified] : Has her schizophrenia for which patient is under treatment

## 2024-04-12 LAB
ALBUMIN SERPL ELPH-MCNC: 4.4 G/DL
ALP BLD-CCNC: 123 U/L
ALT SERPL-CCNC: 25 U/L
ANION GAP SERPL CALC-SCNC: 12 MMOL/L
AST SERPL-CCNC: 23 U/L
BILIRUB SERPL-MCNC: 0.3 MG/DL
BUN SERPL-MCNC: 16 MG/DL
CALCIUM SERPL-MCNC: 9.4 MG/DL
CHLORIDE SERPL-SCNC: 106 MMOL/L
CO2 SERPL-SCNC: 26 MMOL/L
CREAT SERPL-MCNC: 1.35 MG/DL
EGFR: 58 ML/MIN/1.73M2
GLUCOSE SERPL-MCNC: 80 MG/DL
HCT VFR BLD CALC: 51.3 %
HGB BLD-MCNC: 16.3 G/DL
MCHC RBC-ENTMCNC: 27.6 PG
MCHC RBC-ENTMCNC: 31.8 GM/DL
MCV RBC AUTO: 86.9 FL
PLATELET # BLD AUTO: 203 K/UL
POTASSIUM SERPL-SCNC: 4.6 MMOL/L
PROT SERPL-MCNC: 6.7 G/DL
PSA SERPL-MCNC: 3.32 NG/ML
RBC # BLD: 5.9 M/UL
RBC # FLD: 15.2 %
SODIUM SERPL-SCNC: 145 MMOL/L
T4 SERPL-MCNC: 6.3 UG/DL
TSH SERPL-ACNC: 1.66 UIU/ML
WBC # FLD AUTO: 13.71 K/UL

## 2024-04-22 ENCOUNTER — RX RENEWAL (OUTPATIENT)
Age: 69
End: 2024-04-22

## 2024-04-22 RX ORDER — FAMOTIDINE 20 MG/1
20 TABLET, FILM COATED ORAL
Qty: 90 | Refills: 3 | Status: ACTIVE | COMMUNITY
Start: 2022-03-30 | End: 1900-01-01

## 2024-04-30 ENCOUNTER — APPOINTMENT (OUTPATIENT)
Dept: OTOLARYNGOLOGY | Facility: CLINIC | Age: 69
End: 2024-04-30
Payer: MEDICARE

## 2024-04-30 VITALS
SYSTOLIC BLOOD PRESSURE: 131 MMHG | HEART RATE: 70 BPM | WEIGHT: 225 LBS | BODY MASS INDEX: 30.48 KG/M2 | DIASTOLIC BLOOD PRESSURE: 88 MMHG | HEIGHT: 72 IN

## 2024-04-30 DIAGNOSIS — K21.9 GASTRO-ESOPHAGEAL REFLUX DISEASE W/OUT ESOPHAGITIS: ICD-10-CM

## 2024-04-30 DIAGNOSIS — R09.02 HYPOXEMIA: ICD-10-CM

## 2024-04-30 DIAGNOSIS — J38.1 POLYP OF VOCAL CORD AND LARYNX: ICD-10-CM

## 2024-04-30 DIAGNOSIS — R49.9 UNSPECIFIED VOICE AND RESONANCE DISORDER: ICD-10-CM

## 2024-04-30 DIAGNOSIS — R13.12 DYSPHAGIA, OROPHARYNGEAL PHASE: ICD-10-CM

## 2024-04-30 DIAGNOSIS — J38.4 EDEMA OF LARYNX: ICD-10-CM

## 2024-04-30 DIAGNOSIS — G47.33 OBSTRUCTIVE SLEEP APNEA (ADULT) (PEDIATRIC): ICD-10-CM

## 2024-04-30 DIAGNOSIS — J34.2 DEVIATED NASAL SEPTUM: ICD-10-CM

## 2024-04-30 PROCEDURE — 99213 OFFICE O/P EST LOW 20 MIN: CPT | Mod: 25

## 2024-04-30 PROCEDURE — 31575 DIAGNOSTIC LARYNGOSCOPY: CPT

## 2024-04-30 NOTE — PHYSICAL EXAM
[] : septum deviated to the left [Midline] : trachea located in midline position [Normal] : orientation to person, place, and time: normal [Hearing Loss Right Only] : normal [FreeTextEntry8] :  cerumen removed via curettage superiorly [Hearing Loss Left Only] : normal [FreeTextEntry9] :  cerumen removed via curettage [de-identified] : wide maxillary crest bilaterally [de-identified] :  mildly inflamed turbinates [de-identified] : class 2 [de-identified] : type 3 oral cavity [FreeTextEntry2] :  sinuses nontender to percussion.  sensations intact [de-identified] : pupils are equal and reactive

## 2024-04-30 NOTE — PROCEDURE
[Complicated Symptoms] : complicated symptoms requiring more thorough examination than provided by mirror [de-identified] :  Procedure: Flexible Fiberoptic Laryngoscopy: Risks, benefits, and alternatives of flexible endoscopy were explained to the patient. The patient gave oral consent to proceed. The flexible scope was inserted into the right nasal cavity and advanced towards the nasopharynx. Visualized mucosa over the turbinates and septum were as described above. The nasopharynx was clear. Oropharyngeal walls were symmetric and mobile without lesion, mass, or edema. Hypopharynx was also without lesion or edema. Larynx was mobile without lesions. Supraglottic structures were free of mass and asymmetry, but edema of the postcricoid, arytenoids and interarytenoids. True vocal folds were white without mass or lesion. Base of tongue was symmetric but full. -narrow at the level of the tonsils, soft palate, and pharynx -BOT is symmetric, but full -epiglottis normal -edema of the postcricoid, arytenoids and interarytenoids. -no pooling -vocal cords are open and look normal

## 2024-04-30 NOTE — ADDENDUM
[FreeTextEntry1] :  Documented by Saleem Zartae acting as scribe for Dr. Sim on 04/30/2024. All Medical record entries made by the Scribe were at my, Dr. Sim, direction and personally dictated by me on 04/30/2024 . I have reviewed the chart and agree that the record accurately reflects my personal performance of the history, physical exam, assessment and plan. I have also personally directed, reviewed, and agreed with the discharge instructions.

## 2024-04-30 NOTE — ASSESSMENT
[FreeTextEntry1] :  Reviewed and reconciled medications, allergies, PMHx, PSHx, SocHx, FMHx.  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 He states that he still smokes a pack of cigarettes per day. He denies using CPAP, but he states that he is going to get an oral appliance.   Physical exam: -right ear canal: cerumen removed via curettage superiorly -left ear canal: cerumen removed via curettage -mildly deviated septum left -mildly inflamed turbinates -wide maxillary crest bilaterally -class 2 tonsils -type 3 oral cavity -pupils are equal and reactive  ENT Procedure: Flexible laryngoscopy -narrow at the level of the tonsils, soft palate, and pharynx -BOT is symmetric, but full -epiglottis normal -edema of the postcricoid, arytenoids and interarytenoids. -no pooling -vocal cords are open and look normal   Plan: -Referred to dentist for oral appliance -FU in 6 months

## 2024-04-30 NOTE — HISTORY OF PRESENT ILLNESS
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 He states that he still smokes a pack of cigarettes per day. He denies using CPAP, but he states that he is going to get an oral appliance.

## 2024-05-06 ENCOUNTER — APPOINTMENT (OUTPATIENT)
Dept: PULMONOLOGY | Facility: CLINIC | Age: 69
End: 2024-05-06
Payer: MEDICARE

## 2024-05-06 PROCEDURE — 94727 GAS DIL/WSHOT DETER LNG VOL: CPT

## 2024-05-06 PROCEDURE — 94060 EVALUATION OF WHEEZING: CPT

## 2024-05-06 PROCEDURE — 94729 DIFFUSING CAPACITY: CPT

## 2024-05-13 ENCOUNTER — APPOINTMENT (OUTPATIENT)
Dept: PULMONOLOGY | Facility: CLINIC | Age: 69
End: 2024-05-13
Payer: MEDICARE

## 2024-05-13 VITALS
HEART RATE: 75 BPM | RESPIRATION RATE: 16 BRPM | BODY MASS INDEX: 30.92 KG/M2 | HEIGHT: 72 IN | TEMPERATURE: 97.8 F | SYSTOLIC BLOOD PRESSURE: 130 MMHG | DIASTOLIC BLOOD PRESSURE: 77 MMHG | WEIGHT: 228.31 LBS | OXYGEN SATURATION: 96 %

## 2024-05-13 DIAGNOSIS — Z87.891 PERSONAL HISTORY OF NICOTINE DEPENDENCE: ICD-10-CM

## 2024-05-13 DIAGNOSIS — F17.200 NICOTINE DEPENDENCE, UNSPECIFIED, UNCOMPLICATED: ICD-10-CM

## 2024-05-13 DIAGNOSIS — R06.09 OTHER FORMS OF DYSPNEA: ICD-10-CM

## 2024-05-13 DIAGNOSIS — J44.9 CHRONIC OBSTRUCTIVE PULMONARY DISEASE, UNSPECIFIED: ICD-10-CM

## 2024-05-13 PROCEDURE — 99407 BEHAV CHNG SMOKING > 10 MIN: CPT

## 2024-05-13 PROCEDURE — 99215 OFFICE O/P EST HI 40 MIN: CPT

## 2024-05-15 NOTE — ASSESSMENT
[FreeTextEntry1] : Mr. CRISTAL BERGMAN is a 66 year old former smoker (>30 pack year hx, quit 10/2021) w Schizophrenia, NIKIA here for f/u.  #Smoker- unfortunately started smoking again.  Negative effects of continued smoking were discussed with patient.  Patient is not ready to quit. -- PFTs with no evidence of obstruction.--Patient has been on Trelegy, thinks it is helping him. However, given normal PFTs, would like trial off trelegy --LDCT 2/2024 -  The lungs demonstrate postsurgical deformity in the right lateral lung base including outward herniation of lung parenchyma through a chest wall defect. There are coarse linear interstitial opacities extending from the right lateral hilum the pleural surface at and adjacent to this defect. More limited pleural-based opacities in the left lung base suggests atelectasis. No pulmonary nodules recognized. No lung consolidation or chest mass is recognized. -- would repeat CT chest in 6 mo to ensure stability  #Sleep disordered breathing- patient with obstructive and central apneas on PSG but short total sleep time. AHI of 112 may be artificially increased. -- Patient never went for follow-up sleep study. Was prescribed AutoPap but has not been using. This was again discussed with patient.  We also discussed Inspire therapy -patient is interested either.  He will consider following up with dentist for dental appliance.  Information provided  #Dilated ascending aortia - noted on CT, stable. Seen by cardiology. -- Will consider additional cardiac evaluation such as stress testing given persistent dyspnea on exertion with normal PFTs  #HCM -up-to-date with pneumococcal vaccination; COVID vaccinated, boosted;  All questions answered. Patient in agreement with plan. Follow up in 3 mo or sooner if needed.

## 2024-05-15 NOTE — HISTORY OF PRESENT ILLNESS
[Current] : current [>= 20 pack years] : >= 20 pack years [Never] : never [TextBox_4] : Mr. CRISTAL BERGMAN is a 67 year old smoker (>30 pack year hx, quit 10/2021) with Schizophrenia, COPD here for f/u.   Still smoking - about 1ppd.  Not ready to quit He has been using Trelegy daily.  He believes it is helping him.  Notes some PRYOR when carrying heavy loads.  Slow walking up the stairs  He hHas aPAP - not using it, can't sleep with it.  Has not followed up with dental appliance  PFTs 4/2022 - mod obstruction, normal volumes, normal DLCO. 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 did not have flu shot  [TextBox_11] : 133

## 2024-05-15 NOTE — CONSULT LETTER
[Dear  ___] : Dear  [unfilled], [Consult Letter:] : I had the pleasure of evaluating your patient, [unfilled]. [Please see my note below.] : Please see my note below. [Consult Closing:] : Thank you very much for allowing me to participate in the care of this patient.  If you have any questions, please do not hesitate to contact me. [FreeTextEntry3] : Sincerely,\par  \par  Gayla Bedolla MD\par  [facility] Physician [facility]\par  Pulmonary Medicine\par  tel: [phone]\par  fax: [phone]\par

## 2024-05-16 ENCOUNTER — NON-APPOINTMENT (OUTPATIENT)
Age: 69
End: 2024-05-16

## 2024-05-21 ENCOUNTER — RX RENEWAL (OUTPATIENT)
Age: 69
End: 2024-05-21

## 2024-05-21 RX ORDER — METOPROLOL SUCCINATE 50 MG/1
50 TABLET, EXTENDED RELEASE ORAL DAILY
Qty: 90 | Refills: 0 | Status: ACTIVE | COMMUNITY
Start: 2023-06-16 | End: 1900-01-01

## 2024-05-22 ENCOUNTER — RX RENEWAL (OUTPATIENT)
Age: 69
End: 2024-05-22

## 2024-05-22 RX ORDER — AMLODIPINE BESYLATE 5 MG/1
5 TABLET ORAL
Qty: 90 | Refills: 0 | Status: ACTIVE | COMMUNITY
Start: 2022-10-11 | End: 1900-01-01

## 2024-06-15 ENCOUNTER — APPOINTMENT (OUTPATIENT)
Dept: CARDIOLOGY | Facility: CLINIC | Age: 69
End: 2024-06-15
Payer: MEDICARE

## 2024-06-15 VITALS
WEIGHT: 227 LBS | DIASTOLIC BLOOD PRESSURE: 81 MMHG | OXYGEN SATURATION: 95 % | TEMPERATURE: 98.1 F | HEART RATE: 69 BPM | RESPIRATION RATE: 16 BRPM | HEIGHT: 72 IN | SYSTOLIC BLOOD PRESSURE: 134 MMHG | BODY MASS INDEX: 30.75 KG/M2

## 2024-06-15 DIAGNOSIS — F20.9 SCHIZOPHRENIA, UNSPECIFIED: ICD-10-CM

## 2024-06-15 DIAGNOSIS — I71.21 ANEURYSM OF THE ASCENDING AORTA, WITHOUT RUPTURE: ICD-10-CM

## 2024-06-15 DIAGNOSIS — I10 ESSENTIAL (PRIMARY) HYPERTENSION: ICD-10-CM

## 2024-06-15 PROCEDURE — 99213 OFFICE O/P EST LOW 20 MIN: CPT

## 2024-06-15 NOTE — REASON FOR VISIT
[Hypertension] : hypertension [FreeTextEntry1] : 69 years old male with hypertension, mild renal insufficiency, and schizophrenia comes to the office for routine follow-up

## 2024-06-15 NOTE — ASSESSMENT
[FreeTextEntry1] : Patient's medications reviewed.  Patient will continue present medication.  Patient will have blood work next visit for evaluation of renal insufficiency

## 2024-08-07 ENCOUNTER — APPOINTMENT (OUTPATIENT)
Dept: PULMONOLOGY | Facility: CLINIC | Age: 69
End: 2024-08-07

## 2024-08-07 PROCEDURE — 99214 OFFICE O/P EST MOD 30 MIN: CPT | Mod: 25

## 2024-08-07 PROCEDURE — 99407 BEHAV CHNG SMOKING > 10 MIN: CPT

## 2024-08-07 PROCEDURE — 99406 BEHAV CHNG SMOKING 3-10 MIN: CPT

## 2024-08-07 NOTE — CONSULT LETTER
[Dear  ___] : Dear  [unfilled], [Consult Letter:] : I had the pleasure of evaluating your patient, [unfilled]. [Please see my note below.] : Please see my note below. [Consult Closing:] : Thank you very much for allowing me to participate in the care of this patient.  If you have any questions, please do not hesitate to contact me. [FreeTextEntry3] : Sincerely,\par  \par  Gayla Bedolla MD\par  [facility] Physician [facility]\par  Pulmonary Medicine\par  tel: [phone]\par  fax: [phone]\par

## 2024-08-07 NOTE — ASSESSMENT
[FreeTextEntry1] : Mr. CRISTAL BERGMAN is a 66 year old former smoker (>30 pack year hx, quit 10/2021) w Schizophrenia, NIKIA here for f/u.  #Smoker- unfortunately started smoking again.  Negative effects of continued smoking were discussed with patient.  Patient is not ready to quit. -- PFTs with no evidence of obstruction.- can use Albuterol prn --LDCT 2/2024 -  The lungs demonstrate postsurgical deformity in the right lateral lung base including outward herniation of lung parenchyma through a chest wall defect. There are coarse linear interstitial opacities extending from the right lateral hilum the pleural surface at and adjacent to this defect. More limited pleural-based opacities in the left lung base suggests atelectasis. No pulmonary nodules recognized. No lung consolidation or chest mass is recognized. -- would repeat CT chest ordered  #Sleep disordered breathing- patient with obstructive and central apneas on PSG but short total sleep time. AHI of 112 may be artificially increased. -- Patient never went for follow-up sleep study. Was prescribed AutoPap but has not been using. This was again discussed with patient.  We also discussed Inspire therapy -patient is interested either.  He will consider following up with dentist for dental appliance.  Information provided  #Dilated ascending aortia - noted on CT, stable. Seen by cardiology. -- Will consider additional cardiac evaluation such as stress testing given persistent dyspnea on exertion with normal PFTs  #HCM -up-to-date with pneumococcal vaccination; COVID vaccinated, boosted;  All questions answered. Patient in agreement with plan. Follow up in 5-6 mo or sooner if needed.

## 2024-08-07 NOTE — HISTORY OF PRESENT ILLNESS
[Current] : current [>= 20 pack years] : >= 20 pack years [Never] : never [TextBox_4] : Mr. CRISTAL BERGMAN is a 67 year old smoker (>30 pack year hx, quit 10/2021) with Schizophrenia, COPD here for f/u.   Still smoking - about 1ppd.  Not ready to quit Not using inahlers. No SOB. Feels fine.   He hHas aPAP - not using it, can't sleep with it.  Has not followed up with dental appliance  PFTs 4/2022 - mod obstruction, normal volumes, normal DLCO. 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 did not have flu shot  [TextBox_11] : 133

## 2024-08-07 NOTE — PHYSICAL EXAM
[No Acute Distress] : no acute distress [Normal Oropharynx] : normal oropharynx [No Neck Mass] : no neck mass [Normal Appearance] : normal appearance [Normal Rate/Rhythm] : normal rate/rhythm [Normal S1, S2] : normal s1, s2 [No Murmurs] : no murmurs [No Resp Distress] : no resp distress [Clear to Auscultation Bilaterally] : clear to auscultation bilaterally [No Abnormalities] : no abnormalities [Benign] : benign [Normal Gait] : normal gait [No Clubbing] : no clubbing [No Cyanosis] : no cyanosis [FROM] : FROM [1+ Pitting] : 1+ pitting [Normal Color/ Pigmentation] : normal color/ pigmentation [No Focal Deficits] : no focal deficits [Oriented x3] : oriented x3 [Normal Affect] : normal affect [TextBox_68] : Good air movement

## 2024-08-07 NOTE — COUNSELING
[Cessation strategies including cessation program discussed] : Cessation strategies including cessation program discussed [Use of nicotine replacement therapies and other medications discussed] : Use of nicotine replacement therapies and other medications discussed [Encouraged to pick a quit date and identify support needed to quit] : Encouraged to pick a quit date and identify support needed to quit [Yes] : Willing to quit smoking [FreeTextEntry1] : 5 [FreeTextEntry3] : 11

## 2024-08-16 ENCOUNTER — RX RENEWAL (OUTPATIENT)
Age: 69
End: 2024-08-16

## 2024-08-19 ENCOUNTER — APPOINTMENT (OUTPATIENT)
Dept: CT IMAGING | Facility: CLINIC | Age: 69
End: 2024-08-19

## 2024-08-19 ENCOUNTER — RX RENEWAL (OUTPATIENT)
Age: 69
End: 2024-08-19

## 2024-08-20 NOTE — ADDENDUM
[FreeTextEntry1] : Documented by Munira Benjamin acting as scribe for Dr. Sim on 04/17/2023.\par \par All Medical record entries made by the scribe were at my, Dr. Sim,direction and personally dictated by me on 04/17/2023. I have reviewed the chart and agree that the record accurately reflects my personal performance of the history, physical exam, assessment and plan. I have also personally directed, reviewed, and agreed with the discharge instructions.  No Change

## 2024-09-09 ENCOUNTER — APPOINTMENT (OUTPATIENT)
Dept: DERMATOLOGY | Facility: CLINIC | Age: 69
End: 2024-09-09

## 2024-09-21 ENCOUNTER — APPOINTMENT (OUTPATIENT)
Dept: CARDIOLOGY | Facility: CLINIC | Age: 69
End: 2024-09-21

## 2024-09-24 ENCOUNTER — APPOINTMENT (OUTPATIENT)
Dept: CARDIOLOGY | Facility: CLINIC | Age: 69
End: 2024-09-24
Payer: MEDICARE

## 2024-09-24 VITALS
DIASTOLIC BLOOD PRESSURE: 89 MMHG | RESPIRATION RATE: 16 BRPM | TEMPERATURE: 98.1 F | WEIGHT: 231 LBS | SYSTOLIC BLOOD PRESSURE: 146 MMHG | OXYGEN SATURATION: 96 % | BODY MASS INDEX: 31.29 KG/M2 | HEART RATE: 66 BPM | HEIGHT: 72 IN

## 2024-09-24 DIAGNOSIS — N28.9 DISORDER OF KIDNEY AND URETER, UNSPECIFIED: ICD-10-CM

## 2024-09-24 DIAGNOSIS — G47.33 OBSTRUCTIVE SLEEP APNEA (ADULT) (PEDIATRIC): ICD-10-CM

## 2024-09-24 DIAGNOSIS — I10 ESSENTIAL (PRIMARY) HYPERTENSION: ICD-10-CM

## 2024-09-24 DIAGNOSIS — F20.9 SCHIZOPHRENIA, UNSPECIFIED: ICD-10-CM

## 2024-09-24 PROCEDURE — 99213 OFFICE O/P EST LOW 20 MIN: CPT

## 2024-09-24 NOTE — REASON FOR VISIT
[Hypertension] : hypertension [Other: ____] : [unfilled] [FreeTextEntry1] : 69 years old hypertension and mild renal insufficiency serum creatinine 1.35, small aortic aneurysm comes to the office for routine follow

## 2024-09-24 NOTE — ASSESSMENT
[FreeTextEntry1] : Blood pressure taken by me was 120/80.  Patient being followed by pulmonary specialist patient is in the office in about 3-months patient will have blood work BMP to evaluate renal function

## 2024-10-22 ENCOUNTER — RX RENEWAL (OUTPATIENT)
Age: 69
End: 2024-10-22

## 2024-11-16 ENCOUNTER — RX RENEWAL (OUTPATIENT)
Age: 69
End: 2024-11-16

## 2024-11-25 ENCOUNTER — APPOINTMENT (OUTPATIENT)
Dept: CARDIOLOGY | Facility: CLINIC | Age: 69
End: 2024-11-25
Payer: MEDICARE

## 2024-11-25 VITALS
WEIGHT: 226 LBS | BODY MASS INDEX: 30.61 KG/M2 | HEART RATE: 70 BPM | HEIGHT: 72 IN | TEMPERATURE: 97.6 F | DIASTOLIC BLOOD PRESSURE: 91 MMHG | SYSTOLIC BLOOD PRESSURE: 154 MMHG | OXYGEN SATURATION: 98 %

## 2024-11-25 DIAGNOSIS — N28.9 DISORDER OF KIDNEY AND URETER, UNSPECIFIED: ICD-10-CM

## 2024-11-25 DIAGNOSIS — I10 ESSENTIAL (PRIMARY) HYPERTENSION: ICD-10-CM

## 2024-11-25 PROCEDURE — 99213 OFFICE O/P EST LOW 20 MIN: CPT

## 2024-11-25 RX ORDER — ARIPIPRAZOLE 15 MG/1
15 TABLET ORAL
Refills: 0 | Status: ACTIVE | COMMUNITY

## 2024-12-09 ENCOUNTER — APPOINTMENT (OUTPATIENT)
Dept: CARDIOLOGY | Facility: CLINIC | Age: 69
End: 2024-12-09
Payer: MEDICARE

## 2024-12-09 VITALS
DIASTOLIC BLOOD PRESSURE: 82 MMHG | HEART RATE: 71 BPM | WEIGHT: 226 LBS | HEIGHT: 72 IN | BODY MASS INDEX: 30.61 KG/M2 | OXYGEN SATURATION: 97 % | TEMPERATURE: 97.3 F | SYSTOLIC BLOOD PRESSURE: 116 MMHG

## 2024-12-09 DIAGNOSIS — I10 ESSENTIAL (PRIMARY) HYPERTENSION: ICD-10-CM

## 2024-12-09 DIAGNOSIS — I71.21 ANEURYSM OF THE ASCENDING AORTA, WITHOUT RUPTURE: ICD-10-CM

## 2024-12-09 PROCEDURE — 99213 OFFICE O/P EST LOW 20 MIN: CPT

## 2025-01-08 ENCOUNTER — APPOINTMENT (OUTPATIENT)
Dept: PULMONOLOGY | Facility: CLINIC | Age: 70
End: 2025-01-08
Payer: MEDICARE

## 2025-01-08 VITALS
DIASTOLIC BLOOD PRESSURE: 84 MMHG | BODY MASS INDEX: 30.48 KG/M2 | RESPIRATION RATE: 16 BRPM | WEIGHT: 225 LBS | HEART RATE: 74 BPM | SYSTOLIC BLOOD PRESSURE: 147 MMHG | OXYGEN SATURATION: 97 % | HEIGHT: 72 IN

## 2025-01-08 DIAGNOSIS — F17.200 NICOTINE DEPENDENCE, UNSPECIFIED, UNCOMPLICATED: ICD-10-CM

## 2025-01-08 PROCEDURE — 99406 BEHAV CHNG SMOKING 3-10 MIN: CPT

## 2025-01-08 PROCEDURE — 99214 OFFICE O/P EST MOD 30 MIN: CPT

## 2025-01-08 RX ORDER — UMECLIDINIUM 62.5 UG/1
62.5 AEROSOL, POWDER ORAL
Qty: 1 | Refills: 3 | Status: ACTIVE | COMMUNITY
Start: 2025-01-08 | End: 1900-01-01

## 2025-01-23 ENCOUNTER — NON-APPOINTMENT (OUTPATIENT)
Age: 70
End: 2025-01-23

## 2025-01-23 VITALS — HEIGHT: 72 IN | WEIGHT: 225 LBS | BODY MASS INDEX: 30.48 KG/M2

## 2025-01-23 DIAGNOSIS — Z87.891 PERSONAL HISTORY OF NICOTINE DEPENDENCE: ICD-10-CM

## 2025-01-23 DIAGNOSIS — F17.200 NICOTINE DEPENDENCE, UNSPECIFIED, UNCOMPLICATED: ICD-10-CM

## 2025-01-27 ENCOUNTER — RX RENEWAL (OUTPATIENT)
Age: 70
End: 2025-01-27

## 2025-02-06 ENCOUNTER — APPOINTMENT (OUTPATIENT)
Dept: CT IMAGING | Facility: CLINIC | Age: 70
End: 2025-02-06

## 2025-02-06 PROCEDURE — 71271 CT THORAX LUNG CANCER SCR C-: CPT

## 2025-02-07 ENCOUNTER — RX RENEWAL (OUTPATIENT)
Age: 70
End: 2025-02-07

## 2025-02-14 ENCOUNTER — RX RENEWAL (OUTPATIENT)
Age: 70
End: 2025-02-14

## 2025-03-10 ENCOUNTER — APPOINTMENT (OUTPATIENT)
Dept: CARDIOLOGY | Facility: CLINIC | Age: 70
End: 2025-03-10
Payer: MEDICARE

## 2025-03-10 VITALS
TEMPERATURE: 98.1 F | HEART RATE: 72 BPM | BODY MASS INDEX: 31.56 KG/M2 | WEIGHT: 233 LBS | OXYGEN SATURATION: 97 % | HEIGHT: 72 IN | DIASTOLIC BLOOD PRESSURE: 88 MMHG | SYSTOLIC BLOOD PRESSURE: 143 MMHG

## 2025-03-10 DIAGNOSIS — I25.10 ATHEROSCLEROTIC HEART DISEASE OF NATIVE CORONARY ARTERY W/OUT ANGINA PECTORIS: ICD-10-CM

## 2025-03-10 DIAGNOSIS — R06.09 OTHER FORMS OF DYSPNEA: ICD-10-CM

## 2025-03-10 PROCEDURE — 99213 OFFICE O/P EST LOW 20 MIN: CPT

## 2025-03-21 ENCOUNTER — APPOINTMENT (OUTPATIENT)
Dept: CARDIOLOGY | Facility: CLINIC | Age: 70
End: 2025-03-21
Payer: MEDICARE

## 2025-03-21 ENCOUNTER — RX RENEWAL (OUTPATIENT)
Age: 70
End: 2025-03-21

## 2025-03-21 VITALS
DIASTOLIC BLOOD PRESSURE: 87 MMHG | BODY MASS INDEX: 31.56 KG/M2 | HEIGHT: 72 IN | SYSTOLIC BLOOD PRESSURE: 139 MMHG | WEIGHT: 233 LBS | HEART RATE: 69 BPM | OXYGEN SATURATION: 96 % | TEMPERATURE: 97.9 F

## 2025-03-21 DIAGNOSIS — I10 ESSENTIAL (PRIMARY) HYPERTENSION: ICD-10-CM

## 2025-03-21 DIAGNOSIS — I71.21 ANEURYSM OF THE ASCENDING AORTA, WITHOUT RUPTURE: ICD-10-CM

## 2025-03-21 PROCEDURE — 99213 OFFICE O/P EST LOW 20 MIN: CPT

## 2025-04-03 ENCOUNTER — APPOINTMENT (OUTPATIENT)
Dept: CARDIOLOGY | Facility: CLINIC | Age: 70
End: 2025-04-03

## 2025-04-03 PROCEDURE — 78452 HT MUSCLE IMAGE SPECT MULT: CPT

## 2025-04-03 PROCEDURE — 93015 CV STRESS TEST SUPVJ I&R: CPT

## 2025-04-03 PROCEDURE — A9500: CPT

## 2025-05-05 ENCOUNTER — RX RENEWAL (OUTPATIENT)
Age: 70
End: 2025-05-05

## 2025-05-22 ENCOUNTER — APPOINTMENT (OUTPATIENT)
Dept: CARDIOLOGY | Facility: CLINIC | Age: 70
End: 2025-05-22
Payer: MEDICARE

## 2025-05-22 VITALS
HEIGHT: 72 IN | BODY MASS INDEX: 31.56 KG/M2 | DIASTOLIC BLOOD PRESSURE: 87 MMHG | HEART RATE: 69 BPM | SYSTOLIC BLOOD PRESSURE: 150 MMHG | OXYGEN SATURATION: 96 % | WEIGHT: 233 LBS | TEMPERATURE: 97.3 F

## 2025-05-22 DIAGNOSIS — I10 ESSENTIAL (PRIMARY) HYPERTENSION: ICD-10-CM

## 2025-05-22 DIAGNOSIS — I71.21 ANEURYSM OF THE ASCENDING AORTA, WITHOUT RUPTURE: ICD-10-CM

## 2025-05-22 PROCEDURE — 99213 OFFICE O/P EST LOW 20 MIN: CPT

## 2025-05-22 RX ORDER — AMLODIPINE BESYLATE 2.5 MG/1
2.5 TABLET ORAL DAILY
Qty: 90 | Refills: 1 | Status: ACTIVE | COMMUNITY
Start: 2025-05-22 | End: 1900-01-01

## 2025-06-10 ENCOUNTER — APPOINTMENT (OUTPATIENT)
Dept: CARDIOLOGY | Facility: CLINIC | Age: 70
End: 2025-06-10
Payer: MEDICARE

## 2025-06-10 VITALS
DIASTOLIC BLOOD PRESSURE: 88 MMHG | OXYGEN SATURATION: 94 % | HEART RATE: 75 BPM | SYSTOLIC BLOOD PRESSURE: 138 MMHG | WEIGHT: 233 LBS | HEIGHT: 72 IN | BODY MASS INDEX: 31.56 KG/M2

## 2025-06-10 PROCEDURE — 99213 OFFICE O/P EST LOW 20 MIN: CPT

## 2025-06-10 RX ORDER — VARENICLINE TARTRATE 1 MG/1
1 TABLET, FILM COATED ORAL
Refills: 0 | Status: ACTIVE | COMMUNITY

## 2025-06-26 ENCOUNTER — APPOINTMENT (OUTPATIENT)
Dept: DERMATOLOGY | Facility: CLINIC | Age: 70
End: 2025-06-26

## 2025-07-08 ENCOUNTER — APPOINTMENT (OUTPATIENT)
Dept: CARDIOLOGY | Facility: CLINIC | Age: 70
End: 2025-07-08
Payer: MEDICARE

## 2025-07-08 ENCOUNTER — APPOINTMENT (OUTPATIENT)
Dept: DERMATOLOGY | Facility: CLINIC | Age: 70
End: 2025-07-08
Payer: MEDICARE

## 2025-07-08 VITALS
HEART RATE: 77 BPM | SYSTOLIC BLOOD PRESSURE: 149 MMHG | HEIGHT: 72 IN | OXYGEN SATURATION: 95 % | WEIGHT: 235 LBS | DIASTOLIC BLOOD PRESSURE: 80 MMHG | BODY MASS INDEX: 31.83 KG/M2

## 2025-07-08 PROCEDURE — 99213 OFFICE O/P EST LOW 20 MIN: CPT

## 2025-07-08 PROCEDURE — 99214 OFFICE O/P EST MOD 30 MIN: CPT

## 2025-07-08 RX ORDER — BENZOYL PEROXIDE 5 G/100G
5 LIQUID TOPICAL
Qty: 1 | Refills: 6 | Status: ACTIVE | COMMUNITY
Start: 2025-07-08 | End: 1900-01-01

## 2025-07-08 RX ORDER — CLINDAMYCIN PHOSPHATE 10 MG/ML
1 LOTION TOPICAL
Qty: 1 | Refills: 6 | Status: ACTIVE | COMMUNITY
Start: 2025-07-08 | End: 1900-01-01

## 2025-07-09 ENCOUNTER — APPOINTMENT (OUTPATIENT)
Dept: PULMONOLOGY | Facility: CLINIC | Age: 70
End: 2025-07-09
Payer: MEDICARE

## 2025-07-09 PROBLEM — J44.9 COPD, MODERATE: Status: ACTIVE | Noted: 2022-04-13

## 2025-07-09 PROCEDURE — 94729 DIFFUSING CAPACITY: CPT

## 2025-07-09 PROCEDURE — 99406 BEHAV CHNG SMOKING 3-10 MIN: CPT

## 2025-07-09 PROCEDURE — 94060 EVALUATION OF WHEEZING: CPT

## 2025-07-09 PROCEDURE — 94727 GAS DIL/WSHOT DETER LNG VOL: CPT

## 2025-07-09 PROCEDURE — 99214 OFFICE O/P EST MOD 30 MIN: CPT | Mod: 25

## 2025-07-09 RX ORDER — UMECLIDINIUM BROMIDE AND VILANTEROL TRIFENATATE 62.5; 25 UG/1; UG/1
62.5-25 POWDER RESPIRATORY (INHALATION)
Qty: 3 | Refills: 1 | Status: ACTIVE | COMMUNITY
Start: 2025-07-09 | End: 1900-01-01

## 2025-07-24 ENCOUNTER — RX RENEWAL (OUTPATIENT)
Age: 70
End: 2025-07-24

## 2025-08-02 ENCOUNTER — RX RENEWAL (OUTPATIENT)
Age: 70
End: 2025-08-02